# Patient Record
Sex: FEMALE | Race: WHITE | NOT HISPANIC OR LATINO | URBAN - METROPOLITAN AREA
[De-identification: names, ages, dates, MRNs, and addresses within clinical notes are randomized per-mention and may not be internally consistent; named-entity substitution may affect disease eponyms.]

---

## 2017-02-17 ENCOUNTER — TELEPHONE (OUTPATIENT)
Dept: FAMILY MEDICINE | Facility: CLINIC | Age: 22
End: 2017-02-17

## 2017-02-17 DIAGNOSIS — F64.0 GENDER DYSPHORIA IN ADOLESCENT AND ADULT: ICD-10-CM

## 2017-02-17 NOTE — TELEPHONE ENCOUNTER
[facility] phone call message- patient requesting a refill:    Full Medication Name: estradiol (ESTRACE) 2 MG tablet    Dose:    Pharmacy confirmed as   "[facility] - [place], [place] - [address] [facility]  [address] [address]  Phone: [phone] Fax: [phone]    [id number] - [place], [place] - [address]  [address] [address]  Phone: [phone] Fax: [phone]    [facility] - [place], [place] - [address]  [address] [facility] [address]  Phone: [phone] Fax: [phone]    [id number] [place], [place] - [address]  [address] [address]  Phone: [phone] Fax: [phone]    "[facility] - [place], [place] - [address] [facility] [address] [facility] [address] [facility] [address]  [address] [facility] [address]  Phone: [phone] Fax: [phone]  : Yes    Additional Comments: pt requesting Rx refill    OK to leave a message on voice mail? Yes    Primary language: English      needed? No    Call taken on February 17, 2017 at 1:33 PM by Edmar Waters

## 2017-02-18 RX ORDER — ESTRADIOL 2 MG/1
4 TABLET ORAL DAILY
Qty: 60 TABLET | Refills: 0 | Status: ON HOLD
Start: 2017-02-18 | End: 2017-03-29

## 2017-02-18 RX ORDER — ESTRADIOL 2 MG/1
4 TABLET ORAL DAILY
Qty: 180 TABLET | Refills: 3 | Status: SHIPPED
Start: 2017-02-18 | End: 2018-03-27

## 2017-02-18 NOTE — TELEPHONE ENCOUNTER
Called [facility] pharmacy and had prescription of estradiol sent to pharmacy of choice.

## 2017-03-24 ENCOUNTER — HOSPITAL ENCOUNTER (INPATIENT)
Facility: CLINIC | Age: 22
LOS: 4 days | Discharge: HOME | DRG: 881 | End: 2017-03-29
Attending: EMERGENCY MEDICINE | Admitting: PSYCHIATRY & NEUROLOGY
Payer: COMMERCIAL

## 2017-03-24 DIAGNOSIS — R23.2 HOT FLASHES: ICD-10-CM

## 2017-03-24 DIAGNOSIS — F33.3 SEVERE RECURRENT MAJOR DEPRESSIVE DISORDER WITH PSYCHOTIC FEATURES WITH ANXIOUS DISTRESS (H): Primary | ICD-10-CM

## 2017-03-24 DIAGNOSIS — R45.851 SUICIDAL IDEATION: ICD-10-CM

## 2017-03-24 DIAGNOSIS — F41.8 DEPRESSION WITH ANXIETY: ICD-10-CM

## 2017-03-24 LAB — ALCOHOL BREATH TEST: 0 (ref 0–0.01)

## 2017-03-24 PROCEDURE — 99285 EMERGENCY DEPT VISIT HI MDM: CPT | Mod: Z6 | Performed by: EMERGENCY MEDICINE

## 2017-03-24 PROCEDURE — 99285 EMERGENCY DEPT VISIT HI MDM: CPT | Performed by: EMERGENCY MEDICINE

## 2017-03-24 PROCEDURE — 99285 EMERGENCY DEPT VISIT HI MDM: CPT | Mod: 25 | Performed by: EMERGENCY MEDICINE

## 2017-03-24 NOTE — IP AVS SNAPSHOT
"    YOUNG ADULT INPATIENT MENTAL HEALTH: [phone]                                              INTERAGENCY TRANSFER FORM - LAB / IMAGING / EKG / EMG RESULTS   3/24/2017                    Hospital Admission Date: 3/24/2017  COLTON THOMPSON   : 1995  Sex: Female        Attending Provider: Jewels Hartmann MD     Allergies:  No Known Allergies    Infection:  None   Service:  MENTAL HEALT    Ht:  1.76 m (5' 9.29\")   Wt:  86 kg (189 lb 9.5 oz)   Admission Wt:  84.1 kg (185 lb 8 oz)    BMI:  27.76 kg/m 2   BSA:  2.05 m 2            Patient PCP Information     Provider PCP Type    Mahad Hillman DO General         Lab Results - 3 Days      Drug abuse screen 6 urine (chem dep) [[id number]] (Abnormal)  Resulted: 17 1557, Result status: Final result    Ordering provider: Rodrigo May MD  17 193 Resulting lab: [facility]    Specimen Information    Type Source Collected On   Urine Urine 17 1515          Components       Value Reference Range Flag Lab   Amphetamine Qual Urine -- NEG  13   Result:         Negative   Cutoff for a negative amphetamine is 500 ng/mL or less.     Barbiturates Qual Urine -- NEG  13   Result:         Negative   Cutoff for a negative barbiturate is 200 ng/mL or less.     Benzodiazepine Qual Urine -- NEG  13   Result:         Negative   Cutoff for a negative benzodiazepine is 200 ng/mL or less.     Cannabinoids Qual Urine -- NEG A 13   Result:         Positive   Cutoff for a positive cannabinoid is greater than 50 ng/mL. This is an   unconfirmed screening result to be used for medical purposes only.     Cocaine Qual Urine -- NEG  13   Result:         Negative   Cutoff for a negative cocaine is 300 ng/mL or less.     Ethanol Qual Urine -- NEG  13   Result:         Negative   Cutoff for a negative urine ethanol is 0.05 g/dL or less     Opiates Qualitative Urine -- NEG  13   Result:         Negative   Cutoff for a negative opiate is " 300 ng/mL or less.              HCG qualitative urine [[id number]]  Resulted: 03/27/17 1542, Result status: Final result    Ordering provider: Andrew Resendez MD  03/24/17 2002 Resulting lab: [facility]    Specimen Information    Type Source Collected On   Urine Urine 03/27/17 1515          Components       Value Reference Range Flag Lab   HCG Qual Urine Negative NEG  13            Testing Performed By     Lab - Abbreviation Name Director Address Valid Date Range    13 - Unknown [facility] Unknown [address] [facility] [address] 01/15/15 0916 - Present            Unresulted Labs     None      Encounter-Level Documents:     There are no encounter-level documents.      Order-Level Documents:     There are no order-level documents.

## 2017-03-24 NOTE — IP AVS SNAPSHOT
MRN:[id number]                      After Visit Summary   3/24/2017    Ca Ding    MRN: [id number]           Patient Information     Date Of Birth          1995        About your hospital stay     You were admitted on:  March 25, 2017 You last received care in the:  [facility] Adult Inpatient Mental Health    You were discharged on:  March 29, 2017       Who to Call     For medical emergencies, please call 911.  For non-urgent questions about your medical care, please call your primary care provider or clinic, [phone]          Attending Provider     Provider Specialty    Andrew Resendez MD Emergency Medicine    Jewels Hartmann MD Psychiatry       Primary Care Provider Office Phone # Fax #    Mahad Hillamn  [phone]       [facility] [address] [facility] [address]        Future tests that were ordered for you     Behavior Outpatient Eval       Outpatient provider to assess and treat.                  Further instructions from your care team       +Behavioral Discharge Planning and Instructions      Summary: You were admitted on 3/24/2017 to [address] for Depression and Suicidal Ideations.  You were treated by Dr. Jewels Hartmann MD and discharged on 03/29/17.     Disposition: Discharged to home    Main Diagnosis: (Per EMR)  1. Adjustment disorder with depressed mood  2. Borderline Personality Disorder  3. Hallucinogen use disorder (mescaline, acid, radha, mushroom)  4. Cannabis use disorder  5. Nicotine use disorder    Health Care Follow-up Appointments:   Medication Management  Date: April ,14th, 2017.  Time: 11:00am.  Provider: Dr. Conrad at [facility].  Address: [address] [facility] [address], [place] [address].  Phone: [phone]  [facility] has faxed the Discharge Summary and AVS to this provider at Fax: [phone].    Therapy Appointment   Date: Thursday , April 20th, 2017.  Time: 4:00pm    Provider: Kristal  Alondra at [facility] .  [address] [facility] [address] [address].  Phone: [phone].  [facility] has faxed the Discharge Summary and AVS to this provider at Fax: [phone].    DBT Therapy Appointments:  Date: 04/13/17 W/ Shirley Suazo  Time: 02:30 PM  Mental Health Services (DBT):  [facility]  [place] [address]  Phone: [phone]  Fax: [phone]  Medical Records Fax: [phone]  The [facility] has faxed the H&P, D/C Meds, Facesheet, discharge summary and AVS to this clinic at Fax: [phone] Attn: Intake    Attend all scheduled appointments with your outpatient providers. Call at least 24 hours in advance if you need to reschedule an appointment to ensure continued access to your outpatient providers.   Major Treatments, Procedures and Findings: You were provided with: a psychiatric assessment, assessed for medical stability, medication evaluation and/or management, group therapy, art therapy, milieu management, medical interventions and skills/OT groups.    Symptoms to Report: If you experience any of the following symptoms please report them right away to your provider or to family/friends; feeling more aggressive, increased confusion, losing more sleep, mood getting worse or thoughts of suicide.    Early warning signs can include: Early warning signs that could signal a potential relapse could include but not limited to the following; increased depression or anxiety sleep disturbances increased thoughts or behaviors of suicide or self-harm  increased unusual thinking, such as paranoia or hearing voices.    Safety and Wellness: Take all medicines as directed. Make no changes unless your doctor suggests them. Follow treatment recommendations. Refrain from alcohol and non-prescribed drugs.  Ask your support system to help you reduce your access to items that could harm yourself or others. If there is a concern for safety, call 991..    Resources:    Crisis  "Intervention: 516.476.3671 or 405-443-8789 (TTY: 270.572.6388).  Call anytime for help.  National [place] on Mental Illness (www.[place].briseida.org): 789.909.4442 or 333-921-4590.  [place] Association for Children's Mental Health (www.mac.org): 949.405.8572.  Alcoholics Anonymous (www.alcoholics-anonymous.org): Check your phone book for your local chapter.  Suicide Awareness Voices of Education (SAVE) (www.save.org): 245-365-SABB (7743)  National Suicide Prevention Line (www.mentalhealthmn.org): 488-504-OOYR (5398)  Mental Health Consumer/Survivor Network of [place] (www.mhcsn.net): 433.962.3911 or 580-779-5987  Mental Health Association of [place] (www.mentalhealth.org): 748.254.3612 or 193-249-6240  Self- Management and Recovery Training., SMART-- Toll free: 564.198.1693  www.[employer].Cody  [facility] Crisis (COPE) Response - Adult 751 564-3913  Text 4 Life: txt \"LIFE\" to 90229 for immediate support and crisis intervention  Crisis text line: Text \"START\" to 120-621. Free, confidential, 24/7.  Crisis Intervention: 870.528.3504 or 254-176-6309. Call anytime for help.     The treatment team has appreciated the opportunity to work with you. Ca,  please take care and make your recovery a daily recovery. If you have any questions or concerns our unit number is [phone]. You will be receiving a follow-up phone call within the next three days from a representative from behavioral health. You have identified the best phone number to reach you as [phone] (home) None (work).      Pending Results     No orders found from 3/22/2017 to 3/25/2017.            Admission Information     Date & Time Provider Department Dept. Phone    3/24/2017 Jewels Hartmann MD Young Adult Inpatient Mental Health [phone]      Your Vitals Were     Blood Pressure Pulse Temperature Respirations Height Weight    120/84 74 97.2  F (36.2  C) (Oral) 16 1.76 m (5' 9.29\") 86 kg (189 lb 9.5 oz)    Pulse Oximetry BMI (Body Mass Index)                " 97% 27.76 kg/m2          [employer] Information     [employer] gives you secure access to your electronic health record. If you see a primary care provider, you can also send messages to your care team and make appointments. If you have questions, please call your primary care clinic.  If you do not have a primary care provider, please call [phone] and they will assist you.        Care EveryWhere ID     This is your Care EveryWhere ID. 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 If we are uncertain of the size of tablets/capsules you have at home, strength may be listed as something that might have changed.        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 Continue taking this medication, and follow the directions you see here.   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)        Dose:  40 mg   Take 1 tablet (40 mg) by mouth daily   Quantity:  21 tablet   Refills:  0         CONTINUE these medicines which have NOT CHANGED        Dose / Directions    progesterone 100 MG capsule   Commonly known as:  PROMETRIUM   Used for:  Hot flashes        Dose:  100 mg   Take 1 capsule (100 mg) by mouth 2 times daily   Quantity:  28 capsule   Refills:  0         STOP taking     gabapentin 300 MG capsule   Commonly known as:  NEURONTIN   Replaced by:  gabapentin 600 MG tablet                Where to get your medicines      These medications were sent to [facility] [place], [place] - [address]  [address] [place] [address], [facility] [address]     Phone:  [phone]     citalopram 40 MG tablet    gabapentin 600 MG tablet    lurasidone 40 MG Tabs tablet    progesterone 100 MG capsule                Protect others around you: Learn how to safely use, store and throw away your medicines at www.disposemymeds.org.             Medication List: This is a list of all your medications and when to take them. Check marks below indicate your daily home schedule. Keep this list as a reference.      Medications           Morning Afternoon Evening Bedtime As Needed    citalopram 40 MG tablet   Commonly known as:  celeXA   Take 1 tablet (40 mg) by mouth daily for 21 days   Last time this was given:  40 mg on 3/29/2017  8:11 AM                                   estradiol 2 MG tablet   Commonly known as:  ESTRACE   Take 2 tablets (4 mg) by mouth daily   Last time this was given:  4 mg on 3/28/2017 10:09 PM                                   gabapentin 600 MG tablet   Commonly known as:  NEURONTIN   Take 2 tablets (1,200 mg) by mouth 3 times daily for 21 days   Last time this was given:  1,200 mg on 3/29/2017  8:11 AM                                          lurasidone 40 MG Tabs tablet   Commonly known as:  LATUDA   Take 1 tablet (40 mg) by mouth daily   Last time this was given:  40 mg on 3/28/2017  6:37 PM                                   progesterone 100 MG capsule   Commonly known as:  PROMETRIUM   Take 1 capsule (100 mg) by mouth 2 times daily   Last time this was given:  100 mg on 3/29/2017  8:12 AM

## 2017-03-24 NOTE — IP AVS SNAPSHOT
Young Adult [facility]    [facility] [address]    [address] [address]    Phone:  [phone]                                       After Visit Summary   3/24/2017    Ca Ding    MRN: [id number]           After Visit Summary Signature Page     I have received my discharge instructions, and my questions have been answered. I have discussed any challenges I see with this plan with the nurse or doctor.    ..........................................................................................................................................  Patient/Patient Representative Signature      ..........................................................................................................................................  Patient Representative Print Name and Relationship to Patient    ..................................................               ................................................  Date                                            Time    ..........................................................................................................................................  Reviewed by Signature/Title    ...................................................              ..............................................  Date                                                            Time

## 2017-03-25 PROBLEM — R45.851 SUICIDAL IDEATION: Status: ACTIVE | Noted: 2017-03-25

## 2017-03-25 PROCEDURE — H2032 ACTIVITY THERAPY, PER 15 MIN: HCPCS

## 2017-03-25 PROCEDURE — 25000132 ZZH RX MED GY IP 250 OP 250 PS 637: Performed by: EMERGENCY MEDICINE

## 2017-03-25 PROCEDURE — 25000132 ZZH RX MED GY IP 250 OP 250 PS 637: Performed by: PSYCHIATRY & NEUROLOGY

## 2017-03-25 PROCEDURE — 99207 ZZC CDG-EXAM COMPONENT: MEETS DETAILED - DOWN CODED: CPT | Performed by: PSYCHIATRY & NEUROLOGY

## 2017-03-25 PROCEDURE — 25000125 ZZHC RX 250: Performed by: PSYCHIATRY & NEUROLOGY

## 2017-03-25 PROCEDURE — 12400001 ZZH R&B MH UMMC

## 2017-03-25 PROCEDURE — 99221 1ST HOSP IP/OBS SF/LOW 40: CPT | Mod: AI | Performed by: PSYCHIATRY & NEUROLOGY

## 2017-03-25 RX ORDER — LURASIDONE HYDROCHLORIDE 20 MG/1
20 TABLET, FILM COATED ORAL AT BEDTIME
Status: DISCONTINUED | OUTPATIENT
Start: 2017-03-25 | End: 2017-03-25

## 2017-03-25 RX ORDER — LURASIDONE HYDROCHLORIDE 20 MG/1
40 TABLET, FILM COATED ORAL AT BEDTIME
Status: DISCONTINUED | OUTPATIENT
Start: 2017-03-25 | End: 2017-03-25

## 2017-03-25 RX ORDER — NICOTINE 21 MG/24HR
1 PATCH, TRANSDERMAL 24 HOURS TRANSDERMAL DAILY
Status: DISCONTINUED | OUTPATIENT
Start: 2017-03-25 | End: 2017-03-29 | Stop reason: HOSPADM

## 2017-03-25 RX ORDER — CITALOPRAM HYDROBROMIDE 20 MG/1
40 TABLET ORAL DAILY
Status: DISCONTINUED | OUTPATIENT
Start: 2017-03-25 | End: 2017-03-29 | Stop reason: HOSPADM

## 2017-03-25 RX ORDER — GABAPENTIN 300 MG/1
1200 CAPSULE ORAL 3 TIMES DAILY
Status: DISCONTINUED | OUTPATIENT
Start: 2017-03-25 | End: 2017-03-28 | Stop reason: CLARIF

## 2017-03-25 RX ORDER — ONDANSETRON 4 MG/1
4 TABLET, ORALLY DISINTEGRATING ORAL EVERY 6 HOURS PRN
Status: DISCONTINUED | OUTPATIENT
Start: 2017-03-25 | End: 2017-03-29 | Stop reason: HOSPADM

## 2017-03-25 RX ORDER — HYDROXYZINE HYDROCHLORIDE 25 MG/1
25-50 TABLET, FILM COATED ORAL EVERY 4 HOURS PRN
Status: DISCONTINUED | OUTPATIENT
Start: 2017-03-25 | End: 2017-03-29 | Stop reason: HOSPADM

## 2017-03-25 RX ORDER — ACETAMINOPHEN 325 MG/1
650 TABLET ORAL EVERY 4 HOURS PRN
Status: DISCONTINUED | OUTPATIENT
Start: 2017-03-25 | End: 2017-03-29 | Stop reason: HOSPADM

## 2017-03-25 RX ORDER — NEOMYCIN/BACITRACIN/POLYMYXINB 3.5-400-5K
OINTMENT (GRAM) TOPICAL 2 TIMES DAILY
Status: DISCONTINUED | OUTPATIENT
Start: 2017-03-25 | End: 2017-03-29 | Stop reason: HOSPADM

## 2017-03-25 RX ORDER — LURASIDONE HYDROCHLORIDE 20 MG/1
40 TABLET, FILM COATED ORAL DAILY
Status: DISCONTINUED | OUTPATIENT
Start: 2017-03-25 | End: 2017-03-29 | Stop reason: HOSPADM

## 2017-03-25 RX ORDER — ESTRADIOL 2 MG/1
4 TABLET ORAL DAILY
Status: DISCONTINUED | OUTPATIENT
Start: 2017-03-25 | End: 2017-03-29 | Stop reason: HOSPADM

## 2017-03-25 RX ADMIN — CITALOPRAM HYDROBROMIDE 40 MG: 20 TABLET ORAL at 08:43

## 2017-03-25 RX ADMIN — ONDANSETRON 4 MG: 4 TABLET, ORALLY DISINTEGRATING ORAL at 12:24

## 2017-03-25 RX ADMIN — PROGESTERONE 100 MG: 100 CAPSULE ORAL at 09:35

## 2017-03-25 RX ADMIN — PROGESTERONE 100 MG: 100 CAPSULE ORAL at 22:08

## 2017-03-25 RX ADMIN — GABAPENTIN 1200 MG: 300 CAPSULE ORAL at 02:19

## 2017-03-25 RX ADMIN — PROGESTERONE 100 MG: 100 CAPSULE ORAL at 02:18

## 2017-03-25 RX ADMIN — NICOTINE 1 PATCH: 14 PATCH, EXTENDED RELEASE TRANSDERMAL at 02:17

## 2017-03-25 RX ADMIN — GABAPENTIN 1200 MG: 300 CAPSULE ORAL at 08:43

## 2017-03-25 RX ADMIN — ACETAMINOPHEN 650 MG: 325 TABLET, FILM COATED ORAL at 02:18

## 2017-03-25 RX ADMIN — GABAPENTIN 1200 MG: 300 CAPSULE ORAL at 14:06

## 2017-03-25 RX ADMIN — BACITRACIN, NEOMYCIN, POLYMYXIN B: 400; 3.5; 5 OINTMENT TOPICAL at 21:42

## 2017-03-25 RX ADMIN — NICOTINE 1 PATCH: 14 PATCH, EXTENDED RELEASE TRANSDERMAL at 08:42

## 2017-03-25 RX ADMIN — ESTRADIOL 4 MG: 2 TABLET ORAL at 02:30

## 2017-03-25 RX ADMIN — GABAPENTIN 1200 MG: 300 CAPSULE ORAL at 21:42

## 2017-03-25 RX ADMIN — LURASIDONE HYDROCHLORIDE 40 MG: 20 TABLET, FILM COATED ORAL at 18:54

## 2017-03-25 RX ADMIN — HYDROXYZINE HYDROCHLORIDE 50 MG: 25 TABLET ORAL at 12:46

## 2017-03-25 RX ADMIN — LURASIDONE HYDROCHLORIDE 20 MG: 20 TABLET, FILM COATED ORAL at 02:18

## 2017-03-25 RX ADMIN — ESTRADIOL 4 MG: 2 TABLET ORAL at 21:41

## 2017-03-25 ASSESSMENT — ACTIVITIES OF DAILY LIVING (ADL)
ORAL_HYGIENE: INDEPENDENT
DRESS: STREET CLOTHES
GROOMING: INDEPENDENT
LAUNDRY: WITH SUPERVISION
GROOMING: INDEPENDENT
ORAL_HYGIENE: INDEPENDENT
DRESS: STREET CLOTHES

## 2017-03-25 NOTE — SUMMARY OF CARE
Pt search completed; pt wanded with a metal detector, pockets checked, belongings given to security to be stored, pt changed into scrubs, pt asked to leave a urine sample.  Pt explained the process; a RN will meet with them, as well as a doctor and an accessor, plan will be determined from there.
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
## 2017-03-25 NOTE — PLAN OF CARE
Problem: General Plan of Care (Inpatient Behavioral)  Goal: Team Discussion  Team Plan:   BEHAVIORAL TEAM DISCUSSION     Continued Stay Criteria/Rationale: 72-hour hold 3/24/17 at 2018 due to SI with plan  Plan: Monitor, assess and stabilize  Participants: George Posadas RN; HANNAH Hinoojsa  Summary/Recommendation: The plan is to assess and patient for mental health and medication needs.  The patient will be prescribed medications to treat the identified symptoms.  Upon discharge the patient will be referred to services as appropriate based on the assessment.  Medical/Physical: Deferred to medicine  Progress: Initial

## 2017-03-25 NOTE — PROGRESS NOTES
"   03/25/17 0109   Patient Belongings   Patient Belongings cell phone/electronics;clothing   Belongings Search Yes   Clothing Search Yes   Second Staff Nayla WHANG RN       Security Items:  [employer] debit card ending in 0157  1 [place] passport w/ 2 pay stubs     Items stored on unit:  1 jar of coconut oil  dilation equipment  1 navy blue hoodie with string  1 gray Star Wars babatunde  1 paisley print tank top  1 pair of jeans shorts  1 pair of pink and gray pj bottoms (HAS STRING)  1 stuffed animal (has a ribbon around the neck)  1 box of \"Zombie Fluxx\" cards  1 journal  2 books  3 pairs of underwear  1 necklace 1 pair of canvas sneakers  1 maroon dress  1 iphone with charging cable and power brick  1 set of keys with lanyard  1 work badge  1 sewing kit  1 small pouch with crystals/rocks (7 rocks with patient, 2 rocks in bin b/c too sharp, 9 total)  $3.06 cash  1 Pokemon pin  1 canvas handbag  1 black flashlight    Items with Patient- Brought in on 03/25/17  1- Love Letter Card Game  4- Books    Items with Patient- Brought in on 03/26/17  1- jeans  1-bra  1- dress  1-poncho  1-sweatshirt   7- smooth rocks    Additional belongings brought in by friends on 3/28/17 include the following: Tarot cards, 2 rocks and a small tote with a zipper    ADMISSION:  I am responsible for any personal items that are not sent to the safe or pharmacy. [place] [facility] is not responsible for loss, theft or damage of any property in my possession.    Patient Signature _____________________ Date/Time _____________________    Staff Signature _______________________ Date/Time _____________________    2nd Staff person, if patient is unable/unwilling to sign  ___________________________________ Date/Time _____________________    DISCHARGE:  My personal items have been returned to me.   Patient Signature _____________________ Date/Time _____________________           "

## 2017-03-25 NOTE — ED NOTES
Bed: ED11  Expected date:   Expected time:   Means of arrival:   Comments:  [id number]   22 female  Psych eval

## 2017-03-26 PROCEDURE — H2032 ACTIVITY THERAPY, PER 15 MIN: HCPCS

## 2017-03-26 PROCEDURE — 25000132 ZZH RX MED GY IP 250 OP 250 PS 637: Performed by: EMERGENCY MEDICINE

## 2017-03-26 PROCEDURE — 12400001 ZZH R&B MH UMMC

## 2017-03-26 PROCEDURE — 25000132 ZZH RX MED GY IP 250 OP 250 PS 637: Performed by: PSYCHIATRY & NEUROLOGY

## 2017-03-26 RX ADMIN — GABAPENTIN 1200 MG: 300 CAPSULE ORAL at 14:38

## 2017-03-26 RX ADMIN — ESTRADIOL 4 MG: 2 TABLET ORAL at 22:14

## 2017-03-26 RX ADMIN — GABAPENTIN 1200 MG: 300 CAPSULE ORAL at 08:13

## 2017-03-26 RX ADMIN — PROGESTERONE 100 MG: 100 CAPSULE ORAL at 22:15

## 2017-03-26 RX ADMIN — NICOTINE 1 PATCH: 14 PATCH, EXTENDED RELEASE TRANSDERMAL at 07:47

## 2017-03-26 RX ADMIN — GABAPENTIN 1200 MG: 300 CAPSULE ORAL at 19:21

## 2017-03-26 RX ADMIN — HYDROXYZINE HYDROCHLORIDE 50 MG: 25 TABLET ORAL at 22:19

## 2017-03-26 RX ADMIN — PROGESTERONE 100 MG: 100 CAPSULE ORAL at 07:48

## 2017-03-26 RX ADMIN — BACITRACIN, NEOMYCIN, POLYMYXIN B: 400; 3.5; 5 OINTMENT TOPICAL at 22:28

## 2017-03-26 RX ADMIN — BACITRACIN, NEOMYCIN, POLYMYXIN B: 400; 3.5; 5 OINTMENT TOPICAL at 07:49

## 2017-03-26 RX ADMIN — LURASIDONE HYDROCHLORIDE 40 MG: 20 TABLET, FILM COATED ORAL at 18:27

## 2017-03-26 RX ADMIN — CITALOPRAM HYDROBROMIDE 40 MG: 20 TABLET ORAL at 07:48

## 2017-03-26 ASSESSMENT — ACTIVITIES OF DAILY LIVING (ADL)
DRESS: STREET CLOTHES;INDEPENDENT
ORAL_HYGIENE: INDEPENDENT
DRESS: STREET CLOTHES
ORAL_HYGIENE: INDEPENDENT
GROOMING: INDEPENDENT
GROOMING: INDEPENDENT

## 2017-03-26 NOTE — PROGRESS NOTES
"Pt checked in feeling 'normal.' Pt's stated goal of the day was, \"to call people other than her ex.\" Pt had a calm and cooperative shift. Pt was present in the milieu and social with others. Pt denies feeling depressed or anxious. Pt denies auditory and visual hallucinations. Pt denies SI and SIB. Pt had a multitude of visitors, which made her day. Pt appeared cheerful and had therapeutic interactions with others.      03/26/17 1516   Behavioral Health   Hallucinations denies / not responding to hallucinations   Thinking distractable   Orientation person: oriented;place: oriented;date: oriented;time: oriented   Memory baseline memory   Insight poor   Judgement impaired   Eye Contact out of corner of eyes   Affect full range affect   Mood mood is calm   Physical Appearance/Attire attire appropriate to age and situation   Hygiene well groomed   Suicidality other (see comments)  (Pt denies SI)   Self Injury other (see comment)  (Pt denies SIB)   Activity other (see comment)  (Present and social in the milieu)   Speech clear;coherent   Medication Sensitivity no stated side effects;no observed side effects   Psychomotor / Gait balanced;steady   Safety   Suicidality status 15   Psycho Education   Type of Intervention 1:1 intervention   Response participates, initiates socially appropriate   Hours 0.5   Treatment Detail Self-Reflection   Activities of Daily Living   Hygiene/Grooming independent   Oral Hygiene independent   Dress street clothes   Room Organization independent   Activity   Activity Level of Assistance independent   Behavioral Health Interventions   Depression maintain safety precautions;monitor need to revise level of observation;maintain safe secure environment;assist patient in developing safety plan;assist patient in following safety plan;encourage nutrition and hydration;encourage participation / independence with adls;provide emotional support;establish therapeutic relationship;assist with developing " and utilizing healthy coping strategies;build upon strengths   Social and Therapeutic Interventions (Depression) encourage socialization with peers;encourage effective boundaries with peers;encourage participation in therapeutic groups and milieu activities

## 2017-03-26 NOTE — PROGRESS NOTES
03/25/17 2000   Behavioral Health   Hallucinations auditory   Thinking paranoid;poor concentration   Orientation person: oriented;place: oriented;date: oriented   Memory baseline memory   Insight poor   Judgement impaired   Eye Contact at floor;into space;staring   Affect blunted, flat;sad   Mood depressed;hopeless;anxious   Physical Appearance/Attire attire appropriate to age and situation   Hygiene well groomed   Suicidality chronic thoughts with no stated plan   Self Injury active   Activity isolative;withdrawn   Speech clear;coherent   Medication Sensitivity other (see comment)  (doesn't think medication is working)   Psychomotor / Gait slow;balanced;steady   Activities of Daily Living   Hygiene/Grooming independent   Oral Hygiene independent   Dress street clothes   Laundry with supervision   Room Organization independent     Patient states she is depressed and anxious. She feels as though no one wants her around and that she is a burden. Patient has been self-harming by hitting herself & scratching self. Examiner talked to patient for a while to try and figure out coping skills. Patient states she is a [employer] and would appreciate a visit from the  with some resources. Staff left message with Spiritual Services. Staff encouraged patient to sit in lounge with other patients and participate in activities. Patient responds well to 1:1 talks but needs direction when involving self on milieu. Patient is extremely.sensitive to perceived peer rejection. Patient complains of pain when dilating vagina and says there was nerve damage with surgery. Patient says she spoke to doctor but not much happened in regards to the pain. Examiner encouraged patient to bring this up with unit doctor. Patient also states she is hallucinating visually and seeing the walls/floor move.

## 2017-03-26 NOTE — H&P
"DATE OF ADMISSION:  03/24/2017.        DATE OF SERVICE:  03/25/2017.      CHIEF COMPLAINT:  \"I'm at a 10 out of 10 for suicidal thoughts.\"      HISTORY OF PRESENT ILLNESS:  Ca Ding is a 22-year-old male to female transgender individual who has had surgical reassignment.  The patient presented with depression and suicidal thinking in the setting of conflict with her fiance.  She reports that she and her fiance have an open polyamorous relationship.  The fiance apparently had told the patient that she was using birth control but recently became pregnant with another partner.  Ca is feeling as though this relationship is ending which has stressed her out and she is feeling extremely depressed, sad and suicidal at this time.  She is having thoughts to cut and thoughts of trying to hurt herself here on the unit.  As I process with her, though, she indicates that she will continue to be able to tell staff her thoughts to keep herself safe.  She does not feel that she needs a 1:1 or that she needs her room locked so she cannot be alone.      PAST PSYCHIATRIC HISTORY:  The patient denies ever having an inpatient hospitalization before.  When I tried to ask her about past suicidality she shut down and asked if she could end the interview.      PAST MEDICAL HISTORY:  Relevant medical history as noted in HPI.      SUBSTANCE HISTORY:  Again, patient had stopped answering questions prior to this.  Decline answered.      PHYSICAL REVIEW OF SYSTEMS:  The patient reported some nausea early in the interview.  Denies other problems on 10-point review of systems except as noted in HPI.      FAMILY HISTORY:  The patient did not answer questions related to family history.      SOCIAL HISTORY:  Relevant social history as noted in HPI.      ALLERGIES:  No known drug allergies.      PRIOR TO ADMISSION MEDICATIONS:   1.  Celexa 40 mg daily.   2.  Gabapentin 1200 mg 3 times daily.     3.  Latuda 40 mg daily.   4.  Oral hormone " medications.      LABORATORY RESULTS:  Alcohol breath was negative.  No other laboratory tests were performed.      VITAL SIGNS:  Temperature 97.3, pulse is 90, respiratory rate is 16, blood pressure is 120/76, oxygen saturation 97% on room air.      PHYSICAL EXAMINATION:  No physical exam as documented at this time.  Await consult by Internal Medicine Service.  I did review assessment by nutritionist.      MENTAL STATUS EXAMINATION:  The patient is casually dressed.  She has poor eye contact.  She has a paucity of spontaneous speech.  She is oriented to person, place and date.  She is severely depressed.  Her affect is tearful.  She has psychomotor retardation.  Muscle strength and tone and gait and station are within normal limits.  Thought process is linear and goal oriented.  Associations are intact.  Thought content is notable for suicidal thinking with plans.  Recent and remote memory are intact.  Fund of knowledge is adequate.  Attention and concentration are intact.  Insight is fair, judgment is poor.      DIAGNOSES:  Major depressive disorder, recurrent, severe, without psychotic features.      PLAN:   1.  Will continue with patient's current medications.  As indicated she shut down the interview and was unwilling to talk further about medication options.  She indicates that she had some twitching with Abilify in the past and found Latuda too sedating at 60 mg.  She thought Celexa was initially helpful and then has been no longer beneficial.  Primary team to consider alternative antidepressant options and alternative neuroleptic options.  May also wish to consider mood stabilizer as patient did report some instability of mood; however, no clear signs of cortney.   2.  Legal:  The patient is currently on a 72-hour hold.  Primary team to assess for safety on Monday to determine if petitioning for commitment is appropriate.   3.  Disposition:  The patient is severely depressed with acute suicidality currently,  requires assessment and treatment in a hospital environment to maintain safety.         RANDY VICTOR MD             D: 2017 15:21   T: 2017 02:19   MT:       Name:     COLTON THOMPSON   MRN:      [id number]        Account:      [id number]   :      1995           Admitted:     [id number]      Document: [id number]

## 2017-03-26 NOTE — PROGRESS NOTES
Pt stated she needs her privacy when using her dilator.  Admits having a room mate might be uncomfortable and in

## 2017-03-26 NOTE — PROGRESS NOTES
"   17   Art Therapy   Type of Intervention structured groups   Response participates with cues/redirection   Hours 4   groups were art therapy, draw yourself as a tree, yoga for relaxation and proper sleep as well as discussing sleep hygiene. Third Group was free choice art, or allowing no art making when discussing because this was a somewhat resistant group to Art Therapy. Writer then gave them the choice to doodle while we had a therapeutic group late afternoon about transforming negative messages we have about ourselves.The last one was a good group. In the morning a group of about 5-6 patients refused groups and played cards in the lounge. Team convened and decided they would have to  go to their rooms if they refused group participation. Writer thought that the best idea because they were banding together and refusing groups based on one female peer saying art therapy was triggering. That playing cards \"helped\" her sucidal ideation. Writer did pull them together as a group and discuss boundaries in regards to this presentation as well as \"what is Art Therapy\"     This patient was not influenced by the negative group dynamic, however this patients moods were very up and down all day. The patient was experiencing a lot of angst. She has a dramatic relationship story . She did overshare but was insightful enough to do it 1:1 with writer and most of the times not in group. She will openly discuss her \" polyamourous life style and how her girlfriend is pregrant by girlfriend's boyfriend and how she will be getting an . She also let writer know her creative body of watercolor paintings is \" vagina paintings\" which she had the insight to know wasn't appropriate on the unit. Writer said she appreciated but was glad she was expressing her creativity freely at home.     She also talked a lot about self harm. She self harmed on the unit, possibly with an eraser. Writer did not have any erasers out at " "all today. She said she was also \" fasting\" as a form of self injury. At the time reported only one ensure for the whole day. She did eat supper after this discussion .Writer did inform evening charge nurse and her psych associate,  To please  give her special attention and  check ins for the evening.  "

## 2017-03-27 LAB
AMPHETAMINES UR QL SCN: ABNORMAL
BARBITURATES UR QL: ABNORMAL
BENZODIAZ UR QL: ABNORMAL
CANNABINOIDS UR QL SCN: ABNORMAL
COCAINE UR QL: ABNORMAL
ETHANOL UR QL SCN: ABNORMAL
HCG UR QL: NEGATIVE
OPIATES UR QL SCN: ABNORMAL

## 2017-03-27 PROCEDURE — 25000132 ZZH RX MED GY IP 250 OP 250 PS 637: Performed by: EMERGENCY MEDICINE

## 2017-03-27 PROCEDURE — H2032 ACTIVITY THERAPY, PER 15 MIN: HCPCS

## 2017-03-27 PROCEDURE — 12400001 ZZH R&B MH UMMC

## 2017-03-27 PROCEDURE — 25000132 ZZH RX MED GY IP 250 OP 250 PS 637: Performed by: PSYCHIATRY & NEUROLOGY

## 2017-03-27 PROCEDURE — 99207 ZZC CDG-MDM COMPONENT: MEETS MODERATE - DOWN CODED: CPT | Performed by: PSYCHIATRY & NEUROLOGY

## 2017-03-27 PROCEDURE — 81025 URINE PREGNANCY TEST: CPT | Performed by: EMERGENCY MEDICINE

## 2017-03-27 PROCEDURE — 80307 DRUG TEST PRSMV CHEM ANLYZR: CPT | Performed by: EMERGENCY MEDICINE

## 2017-03-27 PROCEDURE — 80320 DRUG SCREEN QUANTALCOHOLS: CPT | Performed by: EMERGENCY MEDICINE

## 2017-03-27 PROCEDURE — 99232 SBSQ HOSP IP/OBS MODERATE 35: CPT | Performed by: PSYCHIATRY & NEUROLOGY

## 2017-03-27 RX ORDER — LANOLIN ALCOHOL/MO/W.PET/CERES
3 CREAM (GRAM) TOPICAL
Status: DISCONTINUED | OUTPATIENT
Start: 2017-03-27 | End: 2017-03-29 | Stop reason: HOSPADM

## 2017-03-27 RX ADMIN — PROGESTERONE 100 MG: 100 CAPSULE ORAL at 22:18

## 2017-03-27 RX ADMIN — HYDROXYZINE HYDROCHLORIDE 50 MG: 25 TABLET ORAL at 17:21

## 2017-03-27 RX ADMIN — ESTRADIOL 4 MG: 2 TABLET ORAL at 22:18

## 2017-03-27 RX ADMIN — GABAPENTIN 1200 MG: 300 CAPSULE ORAL at 22:18

## 2017-03-27 RX ADMIN — NICOTINE 1 PATCH: 14 PATCH, EXTENDED RELEASE TRANSDERMAL at 10:19

## 2017-03-27 RX ADMIN — GABAPENTIN 1200 MG: 300 CAPSULE ORAL at 10:21

## 2017-03-27 RX ADMIN — MELATONIN TAB 3 MG 3 MG: 3 TAB at 22:18

## 2017-03-27 RX ADMIN — PROGESTERONE 100 MG: 100 CAPSULE ORAL at 10:21

## 2017-03-27 RX ADMIN — GABAPENTIN 1200 MG: 300 CAPSULE ORAL at 13:52

## 2017-03-27 RX ADMIN — LURASIDONE HYDROCHLORIDE 40 MG: 20 TABLET, FILM COATED ORAL at 19:13

## 2017-03-27 RX ADMIN — CITALOPRAM HYDROBROMIDE 40 MG: 20 TABLET ORAL at 10:21

## 2017-03-27 ASSESSMENT — ACTIVITIES OF DAILY LIVING (ADL)
ORAL_HYGIENE: INDEPENDENT
GROOMING: INDEPENDENT
DRESS: STREET CLOTHES;INDEPENDENT

## 2017-03-27 NOTE — PROGRESS NOTES
"[facility], [place]   Psychiatric Progress Note        Interim History:   The patient's care was discussed with the treatment team during the daily team meeting and/or staff's chart notes were reviewed.      Pt reports that she has been seeing a psychiatrist for 6 months and a therapist for one year now. She has been receiving psychiatric treatment since she was 10 yo.   She has rapid mood swings-one moment she is depressed, next moment she is happy.   She has been diagnosed with Bipolar disorder, PTSD, psychosis, Borderline personality disorder, in the past.   Lately she has been under a lot of stress-financial, relationship, housing. She literally cannot afford to buy food, after paying the rent. She is happy that she gets regular meals here.   On the day of admission, she said she would jump in front of traffic, to her fiance and her boyfriend.   They stopped her from going out of their apartment, then she said that she would slit her wrist, then. Recently she found out that her fiance was pregnant by her boyfriend.   For a few months after Celexa and Latuda were started by her psychiatrist, she felt good. But at one point, they stopped working. As she could not pay for them, for 2 days prior to coming to ER, she had been off meds.     In the past, she tried Abilify, which caused twich, Lamotrigine, which she does not remember if it worked or not.   On the first day after admission, she thoughts that the walls of the unit was breathing and moving.   When she tried Latuda 60mg, it made her overtired.       As of this interview, she was feeling \"great\" without si.   As for substance abuse, she smokes cig 1ppd, Cannabis every day for 4 years, Acid 3 times a month, mushroom 3 times a month,. She tried radha once, mescaline 4 times. She says she really does not want to take SSRI, for fear of serotonin syndrome. She says she has no intention of stopping these drugs.            Medications: " "      citalopram (celeXA) tablet 40 mg  40 mg Oral Daily     estradiol  4 mg Oral Daily     gabapentin  1,200 mg Oral TID     progesterone  100 mg Oral BID     nicotine   Transdermal Q8H     nicotine   Transdermal Daily     nicotine  1 patch Transdermal Daily     lurasidone  40 mg Oral Daily     neomycin-bacitracin-polymyxin   Topical BID          Allergies:   No Known Allergies       Labs:   No results found for this or any previous visit (from the past 24 hour(s)).       Psychiatric Examination:   /68  Pulse 80  Temp 95.8  F (35.4  C) (Oral)  Resp 16  Ht 1.76 m (5' 9.29\")  Wt 84.9 kg (187 lb 3.2 oz)  SpO2 97%  BMI 27.41 kg/m2  Weight is 187 lbs 3.2 oz  Body mass index is 27.41 kg/(m^2).    Appearance: awake, alert  Attitude:  cooperative  Eye Contact:  good  Mood:  better  Affect:  constricted mobility  Speech:  clear, coherent  Psychomotor Behavior:  no evidence of tardive dyskinesia, dystonia, or tics  Throught Process:  linear  Associations:  no loose associations  Thought Content:  no evidence of suicidal ideation or homicidal ideation, no delusion  Insight:  fair  Judgement:  fair  Oriented to:  time, person, and place  Attention Span and Concentration:  fair  Recent and Remote Memory:  fair         Precautions:     Behavioral Orders   Procedures     Code 1 - Restrict to Unit     Routine Programming     As clinically indicated     Status 15     Every 15 minutes.          DIagnoses:   1. Adjustment disorder with depressed mood  2. Borderline Personality Disorder  3. Hallucinogen use disorder( mescaline, acid, radha, mushroom)  4, Cannabis use disorder  5,. Nicotine use disorder         Plan:   1.  Continue current meds as is for now. We discussed potentially changing meds, but without intervention, her mood actually improved already.   2. Continue group/milieu treatment.   3. Referral to PHP.   4. Referral to CD assessment.        "

## 2017-03-27 NOTE — PROGRESS NOTES
Writer introduced self to pt and encouraged pt to seek out CTC should she have any questions or concerns.

## 2017-03-27 NOTE — PROGRESS NOTES
"   03/26/17 2200   Art Therapy   Type of Intervention structured groups   Response participates, initiates socially appropriate   Hours 4   Ca had a great day. She said she mediated at the beginning of the day and had some insights about her SI being related to frustration and that she thought she would take up \" aikido.\" She also enjoyed yoga and asked for a mat and some print outs to do yoga in her room  As a coping skill to use through the day. During the collaborative process, she was a great team member and collaborated well with her partner, and seemed invested in the process and product. Her mood was very much improved today vs yesterday. She had a lot of visitors and she also seemed proud she had reached her daily goals.  "

## 2017-03-27 NOTE — PROGRESS NOTES
SPIRITUAL HEALTH SERVICES  [facility] ([facility]) 4AW      REFERRAL SOURCE: emma Penaloza shared the reasons for coming to the hospital and a bit about their [employer] spirituality. Ca expressed that some lele and bayleaf would be helpful in connecting her to her spirituality, so I will attempt to bring these items tomorrow if possible.    PLAN: I will attempt to locate these items.                                                                                                                                                Arline Howard  Staff   Pager [phone]

## 2017-03-27 NOTE — PROGRESS NOTES
[facility]   Behavioral Health   Hallucinations denies / not responding to hallucinations   Thinking intact   Orientation person: oriented;place: oriented;date: oriented;time: oriented   Memory baseline memory   Insight poor   Judgement impaired   Eye Contact at examiner   Affect full range affect   Mood anxious;depressed;mood is calm   Physical Appearance/Attire attire appropriate to age and situation;neat   Hygiene well groomed   Suicidality other (see comments)  (denies)   Self Injury other (see comment)  (denies)   Activity other (see comment)  (active)   Speech clear;coherent   Medication Sensitivity no stated side effects;no observed side effects   Psychomotor / Gait balanced;steady   Activities of Daily Living   Hygiene/Grooming independent   Oral Hygiene independent   Dress street clothes;independent   Room Organization independent   Behavioral Health Interventions   Depression assist patient in developing safety plan;assist patient in following safety plan;encourage nutrition and hydration;encourage participation / independence with adls;provide emotional support;establish therapeutic relationship;assist with developing and utilizing healthy coping strategies;build upon strengths   Social and Therapeutic Interventions (Depression) encourage socialization with peers;encourage effective boundaries with peers;encourage participation in therapeutic groups and milieu activities     Pt was calm,  Pleasant, and approachable. Pt attended and participated in groups. Pt was social and engaging with others. Pt affect: full range. Pt reported TV show ([email or web address]) was triggering - talk about suicide, this increased Pt's feelings of depression. Pt reported depression (8) and anxiety (6). Pt reported feeling down - Pt doesn't feel want. Anxious/worried about not being DC in time for fiance's . Pt denied SI and SIB. Pt denied hallucinations/psychotic symptoms.

## 2017-03-27 NOTE — PROGRESS NOTES
W met with pt to discuss discharge plans. Pt reports that she would like to do [facility]'s PHP Program which Unit MD also agrees pt would benefit from. [facility] has sent in-basket referral to BEH Intake to have pt assessed for program while pt is still on Unit [address] [place].

## 2017-03-27 NOTE — PROGRESS NOTES
03/26/17 2100   Behavioral Health   Hallucinations denies / not responding to hallucinations   Thinking intact   Orientation person: oriented;place: oriented;date: oriented;time: oriented   Memory baseline memory   Insight poor   Judgement impaired   Eye Contact at examiner   Affect full range affect   Mood anxious;mood is calm   Physical Appearance/Attire attire appropriate to age and situation   Hygiene well groomed   Suicidality thoughts only   Self Injury thoughts only   Activity other (see comment)  (active in milieu)   Speech clear;coherent   Medication Sensitivity no stated side effects;no observed side effects   Psychomotor / Gait balanced;steady   Activities of Daily Living   Hygiene/Grooming independent   Oral Hygiene independent   Dress street clothes;independent   Room Organization independent     Patient was active on milieu and in much better spirits than the day before. Patient attended groups and was social with peers. Patient had anxiety later in evening but sought out staff and appeared to feel better. Constant thoughts of SI and SIB, but no plans. Patient ate dinner and snack. Patient would also like to have a roommate. Pleasant and cooperative.

## 2017-03-27 NOTE — PROGRESS NOTES
"   03/27/17 1700   Art Therapy   Type of Intervention structured groups   Response participates with encouragement   Hours 3   Ca did a nice painting a heart about bieng \" polyamourous and trans\". She was in and out of groups but is a leader in yoga and mediation. She is feeling good with doing daily yoga on the unit she reported.  "

## 2017-03-28 PROCEDURE — 12400001 ZZH R&B MH UMMC

## 2017-03-28 PROCEDURE — 25000132 ZZH RX MED GY IP 250 OP 250 PS 637: Performed by: EMERGENCY MEDICINE

## 2017-03-28 PROCEDURE — 90853 GROUP PSYCHOTHERAPY: CPT

## 2017-03-28 PROCEDURE — 97150 GROUP THERAPEUTIC PROCEDURES: CPT | Mod: GO

## 2017-03-28 PROCEDURE — 99207 ZZC CDG-MDM COMPONENT: MEETS MODERATE - DOWN CODED: CPT | Performed by: PSYCHIATRY & NEUROLOGY

## 2017-03-28 PROCEDURE — 25000132 ZZH RX MED GY IP 250 OP 250 PS 637: Performed by: PSYCHIATRY & NEUROLOGY

## 2017-03-28 PROCEDURE — 99232 SBSQ HOSP IP/OBS MODERATE 35: CPT | Performed by: PSYCHIATRY & NEUROLOGY

## 2017-03-28 RX ORDER — GABAPENTIN 600 MG/1
1200 TABLET ORAL 3 TIMES DAILY
Status: DISCONTINUED | OUTPATIENT
Start: 2017-03-28 | End: 2017-03-29 | Stop reason: HOSPADM

## 2017-03-28 RX ADMIN — GABAPENTIN 1200 MG: 300 CAPSULE ORAL at 08:16

## 2017-03-28 RX ADMIN — PROGESTERONE 100 MG: 100 CAPSULE ORAL at 22:09

## 2017-03-28 RX ADMIN — NICOTINE 1 PATCH: 14 PATCH, EXTENDED RELEASE TRANSDERMAL at 08:14

## 2017-03-28 RX ADMIN — GABAPENTIN 1200 MG: 600 TABLET, FILM COATED ORAL at 13:25

## 2017-03-28 RX ADMIN — MELATONIN TAB 3 MG 3 MG: 3 TAB at 22:10

## 2017-03-28 RX ADMIN — CITALOPRAM HYDROBROMIDE 40 MG: 20 TABLET ORAL at 08:11

## 2017-03-28 RX ADMIN — PROGESTERONE 100 MG: 100 CAPSULE ORAL at 08:11

## 2017-03-28 RX ADMIN — LURASIDONE HYDROCHLORIDE 40 MG: 20 TABLET, FILM COATED ORAL at 18:37

## 2017-03-28 RX ADMIN — ESTRADIOL 4 MG: 2 TABLET ORAL at 22:09

## 2017-03-28 RX ADMIN — GABAPENTIN 1200 MG: 600 TABLET, FILM COATED ORAL at 22:09

## 2017-03-28 RX ADMIN — HYDROXYZINE HYDROCHLORIDE 50 MG: 25 TABLET ORAL at 13:25

## 2017-03-28 ASSESSMENT — ACTIVITIES OF DAILY LIVING (ADL)
DRESS: STREET CLOTHES;INDEPENDENT
ORAL_HYGIENE: INDEPENDENT
GROOMING: INDEPENDENT
DRESS: INDEPENDENT
ORAL_HYGIENE: INDEPENDENT
GROOMING: INDEPENDENT

## 2017-03-28 NOTE — PROGRESS NOTES
Case Management Note  3/28/2017    Pt reports that she is interested in the doing the five day a week MICD program but is concerned about not being able to work for 6-8 weeks. W explained that the program is three hours long each day so maybe she could adjust her schedule or see if there is anyway to do the program and work at the same time. Pt reports she will see and reports she is also interested in doing an intensive DBT Program. Pt was given information on various DBT Programs. W asked pt if she was interested in getting assessed for the MICD Day TX Program on [id number] Pt reports she is interested in attending the program and asked writer to set up the assessment. Writer spoke to MICD Program  who scheduled pt for the 9AM slot on Friday. Pt reports she will let writer know about doing a DBT program later after she has had a chance reviewing the information W gave to her. Pt reports she is doing a lot better and became upset earlier because she thought that she would have to stay until Friday when she could get her assessment. W explained that the pt's D/C is up to the 4A MD and that she could discuss discharging tomorrow with the provider tomorrow morning. Pt reports that she has fencing tomorrow night and that it is something very therapeutic for her to do and looks forward to doing it. Pt denies having any SI's or SIB's at this time.

## 2017-03-28 NOTE — PROGRESS NOTES
"W met with pt regarding doing the MICD Day TX Program due to her daily use of THC prior to admission. Pt reports that she needs a five day a week PHP program and reports that she can stop using drugs anytime as she reports she \"isn't addicted to any drugs\". W explained the admission criteria for the PHP and pt became visibly upset that she couldn't do the program and said to writer \"that is just really harmful that they won't let me do the partial hospitalization program\". Pt then stated she needed to go to her room and then ended the conversation. Writer informed pt's nurse that pt is upset regarding not being able to do the PHP Program at [facility].   "
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
## 2017-03-28 NOTE — PROGRESS NOTES
"Attendance: Pt. Attended 3 of 3 scheduled OT sessions today.   Observations: pt was assertive about \"attending every group so I can have a good assessment.\" Pt reports appreciating activities and discussions, Pt described interest in Anime, medieval re-enactment, and magic the gathering card game. Pt was open about Hx including speaking Azeri and living in [place] prior to [place]. Pt was generally agitated throughout groups demonstrated by reporting it, fidgeting, and asking questions. Pt requested yoga and was grateful to participate in PM yoga group.     03/28/17 1600   Occupational Therapy   Type of Intervention structured groups   Response Participates   Hours 3       "

## 2017-03-28 NOTE — PROGRESS NOTES
Nutrition Services Brief Note  RD attempted to visit with pt 3/25 for unintended weight loss screen however unable to obtain nutrition/wt hx    Pt reports inability to purchase food largest barrier to consuming meals at home.  Notes she does not qualify for food assistance programs d/t income however states she barely has enough money monthly to pay bills.  Since being on unit has been eating well.  Concerned about weight gain as she feels that she is overeating.      Interventions:  -Nutrition education - discussed available resources to improve food access OSH however pt declined at this time.  Encouraged increased intakes of fruits/veggies vs kcal dense foods.  Pt requesting Ensure as meal replacement  -Ordered Ensure PRN per pt preference    Shu Castle RD [facility] [phone]

## 2017-03-28 NOTE — PROGRESS NOTES
03/27/17 2200   Behavioral Health   Hallucinations denies / not responding to hallucinations   Thinking intact   Orientation person: oriented;place: oriented;date: oriented;time: oriented   Memory baseline memory   Insight insight appropriate to situation;insight appropriate to events   Judgement impaired   Eye Contact at examiner   Affect full range affect   Mood depressed;anxious;mood is calm   Physical Appearance/Attire attire appropriate to age and situation   Hygiene well groomed   Suicidality other (see comments)  (denies)   Self Injury other (see comment)  (denies)   Activity other (see comment)  (active in milieu)   Speech clear;coherent   Medication Sensitivity no stated side effects;no observed side effects   Psychomotor / Gait balanced;steady     Patient was active on milieu and attended all groups. Pleasant and cooperative. Patient had visit with friends and mother, and was concerned about mother, but  visit went well. Started feeling depressed later in evening, but was able to talk it out with staff. Is easily triggered by talk/movies with suicide and transphobic comments. Good at seeking out staff for help.

## 2017-03-28 NOTE — PROGRESS NOTES
03/28/17 1500   Behavioral Health   Hallucinations denies / not responding to hallucinations   Thinking intact   Orientation person: oriented;place: oriented;date: oriented;time: oriented   Memory baseline memory   Insight insight appropriate to situation;insight appropriate to events   Judgement impaired   Affect full range affect   Mood labile   Physical Appearance/Attire attire appropriate to age and situation;neat   Hygiene well groomed   Suicidality other (see comments)  (denied)   Self Injury other (see comment)  (denied)   Activity other (see comment)  (active)   Speech clear;coherent   Medication Sensitivity no stated side effects;no observed side effects   Psychomotor / Gait balanced;steady   Psycho Education   Type of Intervention 1:1 intervention   Response participates, initiates socially appropriate   Hours 0.5   Activities of Daily Living   Hygiene/Grooming independent   Oral Hygiene independent   Dress street clothes;independent   Room Organization independent   Behavioral Health Interventions   Depression assist patient in developing safety plan;assist patient in following safety plan;encourage participation / independence with adls;provide emotional support;establish therapeutic relationship;assist with developing and utilizing healthy coping strategies;build upon strengths;encourage nutrition and hydration   Social and Therapeutic Interventions (Depression) encourage socialization with peers;encourage effective boundaries with peers;encourage participation in therapeutic groups and milieu activities     Pt was pleasant and approachable. Pt was social with others. Pt was attended and participated in groups. Pt completed ADL's. Pt denied SI and SIB. Pt denied hallucinations/psychotic symptoms.

## 2017-03-28 NOTE — PROGRESS NOTES
03/27/17 2200   Behavioral Health   Hallucinations denies / not responding to hallucinations   Thinking intact   Orientation person: oriented;place: oriented;date: oriented;time: oriented   Memory baseline memory   Insight insight appropriate to situation;insight appropriate to events   Judgement impaired   Eye Contact at examiner   Affect full range affect   Mood depressed;anxious;mood is calm   Physical Appearance/Attire attire appropriate to age and situation   Hygiene well groomed   Activity other (see comment)  (active in milieu)   Speech clear;coherent   Medication Sensitivity no stated side effects;no observed side effects   Psychomotor / Gait balanced;steady     Patient was active on milieu, pleasant and cooperative. Had visit with mother/friends that went well. Good at seeking out staff for support. Felt sad later in evening because she was missing home. No thoughts of SI or SIB. Good appetite and sleep. Participated in all groups.

## 2017-03-29 VITALS
OXYGEN SATURATION: 97 % | TEMPERATURE: 97.2 F | BODY MASS INDEX: 28.08 KG/M2 | SYSTOLIC BLOOD PRESSURE: 120 MMHG | RESPIRATION RATE: 16 BRPM | HEIGHT: 69 IN | WEIGHT: 189.6 LBS | HEART RATE: 74 BPM | DIASTOLIC BLOOD PRESSURE: 84 MMHG

## 2017-03-29 PROCEDURE — H2032 ACTIVITY THERAPY, PER 15 MIN: HCPCS

## 2017-03-29 PROCEDURE — 25000132 ZZH RX MED GY IP 250 OP 250 PS 637: Performed by: PSYCHIATRY & NEUROLOGY

## 2017-03-29 PROCEDURE — 99239 HOSP IP/OBS DSCHRG MGMT >30: CPT | Performed by: PSYCHIATRY & NEUROLOGY

## 2017-03-29 PROCEDURE — 25000132 ZZH RX MED GY IP 250 OP 250 PS 637: Performed by: EMERGENCY MEDICINE

## 2017-03-29 RX ORDER — CITALOPRAM HYDROBROMIDE 40 MG/1
40 TABLET ORAL DAILY
Qty: 21 TABLET | Refills: 0 | Status: SHIPPED | OUTPATIENT
Start: 2017-03-29 | End: 2017-05-10

## 2017-03-29 RX ORDER — LURASIDONE HYDROCHLORIDE 40 MG/1
40 TABLET, FILM COATED ORAL DAILY
Qty: 21 TABLET | Refills: 0 | Status: SHIPPED | OUTPATIENT
Start: 2017-03-29

## 2017-03-29 RX ORDER — GABAPENTIN 600 MG/1
1200 TABLET ORAL 3 TIMES DAILY
Qty: 126 TABLET | Refills: 0 | Status: SHIPPED | OUTPATIENT
Start: 2017-03-29 | End: 2017-05-10

## 2017-03-29 RX ADMIN — CITALOPRAM HYDROBROMIDE 40 MG: 20 TABLET ORAL at 08:11

## 2017-03-29 RX ADMIN — NICOTINE 1 PATCH: 14 PATCH, EXTENDED RELEASE TRANSDERMAL at 08:12

## 2017-03-29 RX ADMIN — PROGESTERONE 100 MG: 100 CAPSULE ORAL at 08:12

## 2017-03-29 RX ADMIN — GABAPENTIN 1200 MG: 600 TABLET, FILM COATED ORAL at 08:11

## 2017-03-29 ASSESSMENT — ACTIVITIES OF DAILY LIVING (ADL)
LAUNDRY: WITH SUPERVISION
GROOMING: INDEPENDENT
DRESS: INDEPENDENT
ORAL_HYGIENE: INDEPENDENT

## 2017-03-29 NOTE — PLAN OF CARE
"Problem: General Plan of Care (Inpatient Behavioral)  Goal: Individualization/Patient Specific Goal (IP Behavioral)  The patient and/or their representative will achieve their patient-specific goals related to the plan of care.    The patient-specific goals include:    Illness Management Recovery model: Objectives  Patient will identify reason(s) for hospitalization from their perspective.  Patient will identify a minimum of three goals for discharge.  Patient will identify a minimum of three triggers that may increase their symptoms.  Patient will identify a minimum of three coping skills they can do to stay well.   Patient will identify their support system to demonstrate readiness for discharge.    Illness Management & Recovery assists patient to develop relapse prevention as  patient identifies triggers for relapse.  patient identifies a general wellness strategy.  patient identifies the warning signs that they are in danger of relapse.  patient identifies someone they count on to get feedback .  patient identifies ways to take action when in danger of relapse.  patient identifies way to cope with stress or other symptoms.   patient participates in self-reflection.   Outcome: Improving  The patient and/or their representative will achieve their patient-specific goals related to the plan of care.    The patient-specific goals include:      \"Reasons you are in the hospital;\" The patient identifies the following reasons for current hospitalization:   1) \"Suicide Attempt\".  2) \"BPD and mood swings\".  3) \"Suicidal Ideations\".     \"Goals for Discharge\" The patient identifies the following goals for discharge:  1) \"Lower urges of SI's\".  2) \"Stabalize\".          "

## 2017-03-29 NOTE — PROGRESS NOTES
All discharge paperwork reviewed with patient. Patient verbalizes readiness for discharge. Denies SI/SIB at this time and has a plan in place for if these thoughts occur. Patient's mother picked her up at 1230. Patient's belongings and medication were sent with her upon discharge.

## 2017-03-29 NOTE — DISCHARGE SUMMARY
Psychiatric Discharge Summary    Ca Ding MRN# [id number]   Age: 22 year old YOB: 1995     Date of Admission:  3/24/2017  Date of Discharge:  3/29/2017  Admitting Physician:  Jewels Hartmann MD  Discharge Physician:  Jewels Hartmann MD (Contact: [phone])         Event Leading to Hospitalization:   Ca Ding is a 22-year-old male to female transgender individual who has had surgical reassignment. The patient presented with depression and suicidal thinking in the setting of conflict with her fiance. She reports that she and her fiance have an open polyamorous relationship. The fiance apparently had told the patient that she was using birth control but recently became pregnant with another partner. Ca is feeling as though this relationship is ending which has stressed her out and she is feeling extremely depressed, sad and suicidal at this time. She is having thoughts to cut and thoughts of trying to hurt herself here on the unit. As I process with her, though, she indicates that she will continue to be able to tell staff her thoughts to keep herself safe.        See Admission note by He Longoria MD on 3/25/2017 for additional details.          DIagnoses:   1. Major Depressive Disorder, recurrent  2. Borderline Personality Disorder  3. Hallucinogen use disorder( mescaline, acid, radha, mushroom)  4, Cannabis use disorder  5,. Nicotine use disorder           Labs:          Lab Results   Component Value Date    .2 08/08/2016    Lab Results   Component Value Date    CHLORIDE 100.9 08/08/2016    Lab Results   Component Value Date    BUN 8.9 08/08/2016      Lab Results   Component Value Date    POTASSIUM 4.4 08/08/2016    Lab Results   Component Value Date    CO2 28.1 08/08/2016    Lab Results   Component Value Date    CR 0.6 08/08/2016        Lab Results   Component Value Date    HGB 12.5 (L) 08/08/2016    HCT 42.1 03/16/2016    MCV 88.0 03/16/2016     Lab Results   Component Value  Date    GLC 84.4 08/08/2016    GLC 79.0 08/08/2016     Lab Results   Component Value Date    TSH 1.14 11/06/2015   Utox was positive for cannabinoid.         Consults:   We obtained assessment by [facility] staff on 3/28/2017, but patient decided that she will benefit most from intensive DBT program.          Hospital Course:   Ca Ding was admitted to Station 4A with attending Jewels Hatrmann MD as a voluntary patient. The patient was placed under status 15 (15 minute checks) and suicide precaution to ensure patient safety.   Her mood and si quickly improved after she was admitted. She said that she had been very stressed prior to admission. Medications were not changed at all, all outpatient medications were continued.   She did not have significant side effect from meds.   She seemed to have done a lot of self-reflection while she was here, and Ca Ding did participate in groups and was visible in the milieu.     The patient's symptoms of depressed mood and suicidal ideation kept improving during the stay and she became hopeful again for her life.   On the day of discharge, she was looking forward to going to [employer] practice.    Ca Ding was released to home. At the time of discharge Ca Ding was determined to not be a danger to herself or others.          Discharge Medications:     Current Discharge Medication List      CONTINUE these medications which have NOT CHANGED    Details   CITALOPRAM HYDROBROMIDE PO Take 40 mg by mouth daily      !! estradiol (ESTRACE) 2 MG tablet Take 2 tablets (4 mg) by mouth daily  Qty: 60 tablet, Refills: 0    Associated Diagnoses: Gender dysphoria in adolescent and adult      !! estradiol (ESTRACE) 2 MG tablet Take 2 tablets (4 mg) by mouth daily  Qty: 180 tablet, Refills: 3    Associated Diagnoses: Gender dysphoria in adolescent and adult      lurasidone (LATUDA) 20 MG TABS tablet Take 1 tablet (20 mg) by mouth At Bedtime  Qty: 30 tablet, Refills: 3     Associated Diagnoses: Bipolar affective disorder, remission status unspecified (H)      progesterone (PROMETRIUM) 100 MG capsule Take 1 capsule (100 mg) by mouth 2 times daily  Qty: 60 capsule, Refills: 3    Associated Diagnoses: Gender dysphoria      gabapentin (NEURONTIN) 300 MG capsule Take 4 capsules (1,200 mg) by mouth 3 times daily  Qty: 270 capsule, Refills: 1    Associated Diagnoses: Major depressive disorder with single episode, remission status unspecified       !! - Potential duplicate medications found. Please discuss with provider.               Psychiatric Examination:   Appearance:  awake, alert  Attitude:  cooperative  Eye Contact:  good  Mood:  better  Affect:  constricted mobility  Speech:  clear, coherent  Psychomotor Behavior:  no evidence of tardive dyskinesia, dystonia, or tics  Thought Process:  logical  Associations:  no loose associations  Thought Content:  no evidence of suicidal ideation or homicidal ideation  Insight:  fair  Judgment:  fair  Oriented to:  time, person, and place  Attention Span and Concentration:  fair  Recent and Remote Memory:  fair  Language: Able to name objects  Fund of Knowledge: appropriate  Muscle Strength and Tone: normal  Gait and Station: Normal         Discharge Plan:   Medication Management  Date: April ,14th, 2017.  Time: 11:00am.  Provider: Dr. Conrad at [facility].  Address: [address], [place] [address].  Phone: [phone]  The [facility] has faxed the Discharge Summary and AVS to this provider at Fax: [phone].     Therapy Appointment   Date: Thursday , April 20th, 2017.  Time: 4:00pm   Provider: Kristal Cantu at [facility] .  Address: [address] [place]. [facility].  Phone: [phone].  The [facility] has faxed the Discharge Summary and AVS to this provider at Fax: [phone].     DBT Therapy Appointments:  Date: 04/13/17 VILLA Suazo  Time: 02:30 PM  Mental Health Services (DBT):  [address]  [place] [address]  Phone: [phone]  Fax: [phone]  Medical Records Fax: [phone]  Attestation:  The patient has been seen and evaluated by me,  Jewels Hartmann MD   Discharge Time>30 min  Discharged on :3/29/2017

## 2017-03-29 NOTE — PLAN OF CARE
"Problem: Depressive Symptoms  Goal: Depressive Symptoms  Signs and symptoms of listed problems will be absent or manageable.    Patient, prior to discharge, will:  -verbalize decrease in depressive signs/symptoms  -verbalize a decrease in anxiety   -verbalize an understanding of medication regimen   -verbalize absence of SI/SIB   -develop a safety plan  -identify a support system   -will participate in coordination of discharge planning    To promote safety/ mental health    Patient identified the following   Triggers:  ----------  Wellness Strategies:  ----------  Warning Signs:  ----------  Feedback (people they would like to receive feedback from if early warning signs - ex. Friends, family, partner/spouse, support groups):  ----------  Taking Action:  ----------  Ways to [place]:      Self-Reflection & Planning.  Assessed patient s progress completing forms related to Illness Management Recovery (including Personal Plan of Care, Adult Coping Plan, and My Support and Coping Plan) and assisted as needed.    Encouraged patient to continue to consider triggers, wellness strategies, early warning signs, feedback from others, actions to take to prevent relapse, and coping strategies as part of a plan to remain well after leaving the hospital.           Pt pleasant and cooperative this shift. Full range affect. Is hoping to discharge tomorrow. She has fencing tomorrow evening and states \"that would be much more beneficial to me than staying in here\". Pt received a visit from her fiance that went well. Pt denies SI and states she hasn't had any SI since after her first night here. Pt also denies any SIB thoughts or urges. Pt reports that she feels the best she has in a long time. Does report some difficulty sleeping d/t waking up from weird dreams but that this has been an ongoing issue. Pt indicates she would rather follow up with her provider outpatient for sleep disturbances. Pt motivated and goal oriented this shift " during 1:1 conversation with staff.

## 2017-03-30 ENCOUNTER — TELEPHONE (OUTPATIENT)
Dept: BEHAVIORAL HEALTH | Facility: CLINIC | Age: 22
End: 2017-03-30

## 2017-03-30 NOTE — TELEPHONE ENCOUNTER
Per notes pt was scheduled for a DA on Friday 3/31 at 9 am. However, this was removed from the schedule. Confirmed with [facility] that Victor Manuel Irizarry had indicated that the pt cancelled this appt.     ZEINAB Connor, [facility]  3/30/2017

## 2017-04-03 ASSESSMENT — ENCOUNTER SYMPTOMS
DYSPHORIC MOOD: 1
SHORTNESS OF BREATH: 0
ABDOMINAL PAIN: 0
FEVER: 0
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
## 2017-04-05 ENCOUNTER — HOSPITAL ENCOUNTER (INPATIENT)
Facility: CLINIC | Age: 22
LOS: 5 days | Discharge: HOME | DRG: 881 | End: 2017-04-10
Attending: EMERGENCY MEDICINE | Admitting: PSYCHIATRY & NEUROLOGY
Payer: COMMERCIAL

## 2017-04-05 ENCOUNTER — TELEPHONE (OUTPATIENT)
Dept: BEHAVIORAL HEALTH | Facility: CLINIC | Age: 22
End: 2017-04-05

## 2017-04-05 DIAGNOSIS — F43.21 ADJUSTMENT DISORDER WITH DEPRESSED MOOD: ICD-10-CM

## 2017-04-05 DIAGNOSIS — R12 HEARTBURN: ICD-10-CM

## 2017-04-05 DIAGNOSIS — F41.9 ANXIETY: Primary | ICD-10-CM

## 2017-04-05 DIAGNOSIS — F51.5 NIGHTMARE: ICD-10-CM

## 2017-04-05 DIAGNOSIS — R45.851 SUICIDAL IDEATION: ICD-10-CM

## 2017-04-05 LAB
ALBUMIN UR-MCNC: NEGATIVE MG/DL
AMPHETAMINES UR QL SCN: ABNORMAL
APPEARANCE UR: CLEAR
BACTERIA #/AREA URNS HPF: ABNORMAL /HPF
BARBITURATES UR QL: ABNORMAL
BENZODIAZ UR QL: ABNORMAL
BILIRUB UR QL STRIP: NEGATIVE
CANNABINOIDS UR QL SCN: ABNORMAL
COCAINE UR QL: ABNORMAL
COLOR UR AUTO: ABNORMAL
ETHANOL UR QL SCN: ABNORMAL
GLUCOSE UR STRIP-MCNC: NEGATIVE MG/DL
HGB UR QL STRIP: NEGATIVE
KETONES UR STRIP-MCNC: NEGATIVE MG/DL
LEUKOCYTE ESTERASE UR QL STRIP: NEGATIVE
MUCOUS THREADS #/AREA URNS LPF: PRESENT /LPF
NITRATE UR QL: NEGATIVE
OPIATES UR QL SCN: ABNORMAL
PH UR STRIP: 6 PH (ref 5–7)
RBC #/AREA URNS AUTO: 1 /HPF (ref 0–2)
SP GR UR STRIP: 1.01 (ref 1–1.03)
SQUAMOUS #/AREA URNS AUTO: <1 /HPF (ref 0–1)
URN SPEC COLLECT METH UR: ABNORMAL
UROBILINOGEN UR STRIP-MCNC: NORMAL MG/DL (ref 0–2)
WBC #/AREA URNS AUTO: 1 /HPF (ref 0–2)

## 2017-04-05 PROCEDURE — 90853 GROUP PSYCHOTHERAPY: CPT

## 2017-04-05 PROCEDURE — 25000132 ZZH RX MED GY IP 250 OP 250 PS 637: Performed by: EMERGENCY MEDICINE

## 2017-04-05 PROCEDURE — 25000132 ZZH RX MED GY IP 250 OP 250 PS 637: Performed by: CLINICAL NURSE SPECIALIST

## 2017-04-05 PROCEDURE — 99207 ZZC CDG-MDM COMPONENT: MEETS HIGH - UP CODED: CPT | Performed by: CLINICAL NURSE SPECIALIST

## 2017-04-05 PROCEDURE — 12400001 ZZH R&B MH UMMC

## 2017-04-05 PROCEDURE — 99285 EMERGENCY DEPT VISIT HI MDM: CPT | Mod: 25 | Performed by: EMERGENCY MEDICINE

## 2017-04-05 PROCEDURE — 99285 EMERGENCY DEPT VISIT HI MDM: CPT | Mod: Z6 | Performed by: EMERGENCY MEDICINE

## 2017-04-05 PROCEDURE — 81001 URINALYSIS AUTO W/SCOPE: CPT | Performed by: EMERGENCY MEDICINE

## 2017-04-05 PROCEDURE — 99223 1ST HOSP IP/OBS HIGH 75: CPT | Mod: AI | Performed by: CLINICAL NURSE SPECIALIST

## 2017-04-05 PROCEDURE — 80320 DRUG SCREEN QUANTALCOHOLS: CPT | Performed by: EMERGENCY MEDICINE

## 2017-04-05 PROCEDURE — 97150 GROUP THERAPEUTIC PROCEDURES: CPT | Mod: GO

## 2017-04-05 PROCEDURE — 90791 PSYCH DIAGNOSTIC EVALUATION: CPT

## 2017-04-05 PROCEDURE — 80307 DRUG TEST PRSMV CHEM ANLYZR: CPT | Performed by: EMERGENCY MEDICINE

## 2017-04-05 RX ORDER — GABAPENTIN 600 MG/1
1200 TABLET ORAL 3 TIMES DAILY
Status: DISCONTINUED | OUTPATIENT
Start: 2017-04-05 | End: 2017-04-10 | Stop reason: HOSPADM

## 2017-04-05 RX ORDER — PRAZOSIN HYDROCHLORIDE 1 MG/1
1 CAPSULE ORAL AT BEDTIME
Status: DISCONTINUED | OUTPATIENT
Start: 2017-04-05 | End: 2017-04-10 | Stop reason: HOSPADM

## 2017-04-05 RX ORDER — HYDROXYZINE HYDROCHLORIDE 25 MG/1
25-50 TABLET, FILM COATED ORAL EVERY 4 HOURS PRN
Status: DISCONTINUED | OUTPATIENT
Start: 2017-04-05 | End: 2017-04-10 | Stop reason: HOSPADM

## 2017-04-05 RX ORDER — LURASIDONE HYDROCHLORIDE 20 MG/1
40 TABLET, FILM COATED ORAL DAILY
Status: DISCONTINUED | OUTPATIENT
Start: 2017-04-05 | End: 2017-04-10 | Stop reason: HOSPADM

## 2017-04-05 RX ORDER — NICOTINE 21 MG/24HR
1 PATCH, TRANSDERMAL 24 HOURS TRANSDERMAL EVERY 24 HOURS
Status: ON HOLD | COMMUNITY
End: 2017-04-10

## 2017-04-05 RX ORDER — CITALOPRAM HYDROBROMIDE 20 MG/1
40 TABLET ORAL DAILY
Status: DISCONTINUED | OUTPATIENT
Start: 2017-04-05 | End: 2017-04-10 | Stop reason: HOSPADM

## 2017-04-05 RX ORDER — DOCUSATE SODIUM 100 MG/1
100 CAPSULE, LIQUID FILLED ORAL 2 TIMES DAILY PRN
Status: DISCONTINUED | OUTPATIENT
Start: 2017-04-05 | End: 2017-04-10 | Stop reason: HOSPADM

## 2017-04-05 RX ORDER — ESTRADIOL 2 MG/1
4 TABLET ORAL DAILY
Status: DISCONTINUED | OUTPATIENT
Start: 2017-04-05 | End: 2017-04-10 | Stop reason: HOSPADM

## 2017-04-05 RX ORDER — IBUPROFEN 600 MG/1
600 TABLET, FILM COATED ORAL EVERY 6 HOURS PRN
Status: DISCONTINUED | OUTPATIENT
Start: 2017-04-05 | End: 2017-04-10 | Stop reason: HOSPADM

## 2017-04-05 RX ORDER — ACETAMINOPHEN 325 MG/1
650 TABLET ORAL EVERY 6 HOURS PRN
Status: DISCONTINUED | OUTPATIENT
Start: 2017-04-05 | End: 2017-04-10 | Stop reason: HOSPADM

## 2017-04-05 RX ORDER — NICOTINE 21 MG/24HR
1 PATCH, TRANSDERMAL 24 HOURS TRANSDERMAL EVERY 24 HOURS
Status: DISCONTINUED | OUTPATIENT
Start: 2017-04-05 | End: 2017-04-10 | Stop reason: HOSPADM

## 2017-04-05 RX ADMIN — CITALOPRAM HYDROBROMIDE 40 MG: 20 TABLET ORAL at 08:42

## 2017-04-05 RX ADMIN — PROGESTERONE 100 MG: 100 CAPSULE ORAL at 20:35

## 2017-04-05 RX ADMIN — NICOTINE 1 PATCH: 21 PATCH, EXTENDED RELEASE TRANSDERMAL at 08:42

## 2017-04-05 RX ADMIN — PROGESTERONE 100 MG: 100 CAPSULE ORAL at 08:42

## 2017-04-05 RX ADMIN — GABAPENTIN 1200 MG: 600 TABLET, FILM COATED ORAL at 14:15

## 2017-04-05 RX ADMIN — PRAZOSIN HYDROCHLORIDE 1 MG: 1 CAPSULE ORAL at 20:35

## 2017-04-05 RX ADMIN — ESTRADIOL 4 MG: 2 TABLET ORAL at 08:43

## 2017-04-05 RX ADMIN — GABAPENTIN 1200 MG: 600 TABLET, FILM COATED ORAL at 20:34

## 2017-04-05 RX ADMIN — HYDROXYZINE HYDROCHLORIDE 50 MG: 25 TABLET ORAL at 18:27

## 2017-04-05 RX ADMIN — LURASIDONE HYDROCHLORIDE 40 MG: 20 TABLET, FILM COATED ORAL at 08:43

## 2017-04-05 RX ADMIN — GABAPENTIN 1200 MG: 600 TABLET, FILM COATED ORAL at 08:42

## 2017-04-05 ASSESSMENT — ENCOUNTER SYMPTOMS
NERVOUS/ANXIOUS: 1
ABDOMINAL PAIN: 1

## 2017-04-05 ASSESSMENT — ACTIVITIES OF DAILY LIVING (ADL)
GROOMING: INDEPENDENT
DRESS: STREET CLOTHES
GROOMING: INDEPENDENT
DRESS: STREET CLOTHES;INDEPENDENT
ORAL_HYGIENE: INDEPENDENT
LAUNDRY: WITH SUPERVISION
ORAL_HYGIENE: INDEPENDENT
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
## 2017-04-05 NOTE — IP AVS SNAPSHOT
MRN:[id number]                      After Visit Summary   4/5/2017    Ca Ding    MRN: [id number]           Patient Information     Date Of Birth          1995        Designated Caregiver       Most Recent Value    Caregiver    Will someone help with your care after discharge? yes    Name of designated caregiver unknown - has DBT appt on 4/13    Phone number of caregiver unknown    Caregiver address unknown      About your hospital stay     You were admitted on:  April 5, 2017 You last received care in the:  [facility]    You were discharged on:  April 10, 2017       Who to Call     For medical emergencies, please call 911.  For non-urgent questions about your medical care, please call your primary care provider or clinic, [phone]          Attending Provider     Provider Specialty    Ryan Carrero MD Emergency Medicine    [facility]       Primary Care Provider Office Phone # Fax #    Mahad Hillman  [phone]       [place] [address] [facility] [address]        Further instructions from your care team       Behavioral Discharge Planning and Instructions      Summary: You were admitted on 4/5/2017 to [facility] [address] [place] [address] for Depression and Suicidal Ideations.  You were treated by Debra Naegele, APRN, CNS and discharged on 04/10/2017.     Disposition: Discharged to home    Main Diagnosis: (Per EMR)  1. Major depressive disorder without psychotic features.   2. Borderline personality disorder.   3. Suicidal ideation.     Health Care Follow-up Appointments:   Medication Management  Date: April ,14th, 2017.  Time: 11:00am.  Provider: Dr. Conrad at [facility].  Address: [address] [facility] [address], [place] [address].  Phone: [phone]  [facility] has faxed the Discharge Summary and AVS to this provider at Fax: [phone].    Therapy Appointment   Date: Thursday , April 20th,  2017.  Time: 4:00pm    Provider: Kristal Cantu at [facility].  Address: [address] [facility]. [address].  Phone: [phone].  [facility] has faxed the Discharge Summary and AVS to this provider at Fax: [phone].    DBT Therapy Appointments:  Date: 04/13/17 W/ Shirley Suazo  Time: 02:30 PM  Mental Health Services (DBT):  [facility] [address]  [place] [address]  Phone: [phone]  Fax: [phone]  Medical Records Fax: [phone]  [facility] has faxed the H&P, D/C Meds, Facesheet, discharge summary and AVS to this clinic at Fax: [phone] Attn: Intake  Attend all scheduled appointments with your outpatient providers. Call at least 24 hours in advance if you need to reschedule an appointment to ensure continued access to your outpatient providers.   Major Treatments, Procedures and Findings: You were provided with: a psychiatric assessment, assessed for medical stability, medication evaluation and/or management, group therapy, art therapy, milieu management, medical interventions and skills/OT groups.    Symptoms to Report: If you experience any of the following symptoms please report them right away to your provider or to family/friends; feeling more aggressive, increased confusion, losing more sleep, mood getting worse or thoughts of suicide.    Early warning signs can include: Early warning signs that could signal a potential relapse could include but not limited to the following; increased depression or anxiety sleep disturbances increased thoughts or behaviors of suicide or self-harm  increased unusual thinking, such as paranoia or hearing voices.    Safety and Wellness: Take all medicines as directed. Make no changes unless your doctor suggests them. Follow treatment recommendations. Refrain from alcohol and non-prescribed drugs.  Ask your support system to help you reduce your access to items that could harm yourself or others. If there is a concern for  "safety, call 911..    Resources:    Crisis Intervention: 635.971.6562 or 130-914-4576 (TTY: 235.338.6346).  Call anytime for help.  National [place] on Mental Illness (www.mn.briseida.org): 397.529.7727 or 435-156-5006.  [place] Association for Children's Mental Health (www.mac.org): 753.204.9092.  Alcoholics Anonymous (www.alcoholics-anonymous.org): Check your phone book for your local chapter.  Suicide Awareness Voices of Education (SAVE) (www.save.org): 116-654-ZHCV (1048)  National Suicide Prevention Line (www.mentalhealthmn.org): 995-912-JUHR (8346)  Mental Health Consumer/Survivor Network of [place] (www.mhcsn.net): 258.876.5308 or 079-570-7518  Mental Health Association of [place] (www.mentalhealth.org): 759.522.8804 or 061-220-8717  Self- Management and Recovery Training., SMART-- Toll free: 839.591.6150  www.Tagged.Square  [facility] Crisis (COPE) Response - Adult 324 926-5606  Text 4 Life: txt \"LIFE\" to 01569 for immediate support and crisis intervention  Crisis text line: Text \"START\" to 328-062. Free, confidential, 24/7.  Crisis Intervention: 581.752.5443 or 260-247-2221. Call anytime for help.     The treatment team has appreciated the opportunity to work with you. Ca,  please take care and make your recovery a daily recovery. If you have any questions or concerns our unit number is [phone]. You will be receiving a follow-up phone call within the next three days from a representative from behavioral health. You have identified the best phone number to reach you as [phone] (home) None (work).      Pending Results     No orders found from 4/3/2017 to 4/6/2017.            Admission Information     Date & Time Department Dept. Phone    4/5/2017 [facility] [phone]      Your Vitals Were     Blood Pressure Pulse Temperature Respirations Height Weight    119/80 85 96.5  F (35.8  C) (Oral) 16 1.75 m (5' 8.9\") 86.4 kg (190 lb 6.4 oz)    Pulse Oximetry BMI (Body Mass Index)    "             99% 28.2 kg/m2          [employer] Information     [employer] gives you secure access to your electronic health record. If you see a primary care provider, you can also send messages to your care team and make appointments. If you have questions, please call your primary care clinic.  If you do not have a primary care provider, please call [phone] and they will assist you.        Care EveryWhere ID     This is your Care EveryWhere ID. This could be used by other organizations to access your [facility] medical records  [id number]           Review of your medicines      START taking        Dose / Directions    hydrOXYzine 25 MG tablet   Commonly known as:  ATARAX   Used for:  Anxiety        Dose:  25-50 mg   Take 1-2 tablets (25-50 mg) by mouth every 4 hours as needed for anxiety   Quantity:  120 tablet   Refills:  1       prazosin 1 MG capsule   Commonly known as:  MINIPRESS   Used for:  Nightmare        Dose:  1 mg   Take 1 capsule (1 mg) by mouth At Bedtime   Quantity:  30 capsule   Refills:  1         CONTINUE these medicines which have NOT CHANGED        Dose / Directions    citalopram 40 MG tablet   Commonly known as:  celeXA   Used for:  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)        Dose:  40 mg   Take 1 tablet (40 mg) by mouth daily for 21 days   Quantity:  21 tablet   Refills:  0       estradiol 2 MG tablet   Commonly known as:  ESTRACE   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        Dose:  4 mg   Take 2 tablets (4 mg) by mouth daily   Quantity:  180 tablet   Refills:  3       gabapentin 600 MG tablet   Commonly known as:  NEURONTIN   Used for:  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)        Dose:  1200 mg   Take 2 tablets (1,200 mg) by mouth 3 times daily for 21 days   Quantity:  126 tablet   Refills:  0       lurasidone 40 MG Tabs tablet   Commonly known as:  LATUDA   Used for:   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)        Dose:  40 mg   Take 1 tablet (40 mg) by mouth daily   Quantity:  21 tablet   Refills:  0       progesterone 100 MG capsule   Commonly known as:  PROMETRIUM   Used for:  Hot flashes        Dose:  100 mg   Take 1 capsule (100 mg) by mouth 2 times daily   Quantity:  28 capsule   Refills:  0         STOP taking     nicotine 21 MG/24HR 24 hr patch   Commonly known as:  NICODERM CQ                Where to get your medicines      These medications were sent to [facility] [place], [place] - [address]  [address] [address]     Phone:  [phone]     hydrOXYzine 25 MG tablet    prazosin 1 MG capsule                Protect others around you: Learn how to safely use, store and throw away your medicines at www.disposemymeds.org.             Medication List: This is a list of all your medications and when to take them. Check marks below indicate your daily home schedule. Keep this list as a reference.      Medications           Morning Afternoon Evening Bedtime As Needed    citalopram 40 MG tablet   Commonly known as:  celeXA   Take 1 tablet (40 mg) by mouth daily for 21 days   Last time this was given:  40 mg on 4/10/2017  8:19 AM                                estradiol 2 MG tablet   Commonly known as:  ESTRACE   Take 2 tablets (4 mg) by mouth daily   Last time this was given:  4 mg on 4/10/2017  8:19 AM                                   gabapentin 600 MG tablet   Commonly known as:  NEURONTIN   Take 2 tablets (1,200 mg) by mouth 3 times daily for 21 days   Last time this was given:  1,200 mg on 4/10/2017  8:19 AM                                hydrOXYzine 25 MG tablet   Commonly known as:  ATARAX   Take 1-2 tablets (25-50 mg) by mouth every 4 hours as needed for anxiety   Last time this was given:  50 mg on 4/9/2017  4:18 PM                                lurasidone 40 MG Tabs  tablet   Commonly known as:  LATUDA   Take 1 tablet (40 mg) by mouth daily   Last time this was given:  40 mg on 4/9/2017  6:31 PM                                   prazosin 1 MG capsule   Commonly known as:  MINIPRESS   Take 1 capsule (1 mg) by mouth At Bedtime   Last time this was given:  1 mg on 4/9/2017  8:34 PM                                progesterone 100 MG capsule   Commonly known as:  PROMETRIUM   Take 1 capsule (100 mg) by mouth 2 times daily   Last time this was given:  100 mg on 5/11/2017  9:32 AM

## 2017-04-05 NOTE — PROGRESS NOTES
"1. What PRN did patient receive? Hydroxyzine 50 mg    2. What was the patient doing that led to the PRN medication? Requests for increasing anxiety. Rates 10/10.    3. Did they require R/S? No    4. Side effects to PRN medication? None    5. After 1 Hour, patient appeared: Calm, watching TV in the lounge. States \"it helped a little bit.\"   "

## 2017-04-05 NOTE — PROGRESS NOTES
Attendence: Pt. Attended scheduled 2 of 3 OT sessions today.   Observations: pt had saddened affect during group, though participated for some time with minimal answers, pt requested to leave due to feeling tired. Pt remained in room for the remainder of group sessions.     04/05/17 1600   Occupational Therapy   Type of Intervention structured groups   Response Participates   Hours 1.5

## 2017-04-05 NOTE — IP AVS SNAPSHOT
[facility]    [facility] [address]    [address] [facility] [address]    Phone:  [phone]                                       After Visit Summary   4/5/2017    Ca Ding    MRN: [id number]           After Visit Summary Signature Page     I have received my discharge instructions, and my questions have been answered. I have discussed any challenges I see with this plan with the nurse or doctor.    ..........................................................................................................................................  Patient/Patient Representative Signature      ..........................................................................................................................................  Patient Representative Print Name and Relationship to Patient    ..................................................               ................................................  Date                                            Time    ..........................................................................................................................................  Reviewed by Signature/Title    ...................................................              ..............................................  Date                                                            Time

## 2017-04-05 NOTE — PROGRESS NOTES
04/05/17 0538   Patient Belongings   Did you bring any home meds/supplements to the hospital?  Yes   Disposition of meds  Sent to security/pharmacy per site process   Patient Belongings cell phone/electronics;clothing;keys;purse;shoes   Disposition of Belongings ID cards & meds to security/pharmacy. All other items in patient's storage bin.   Belongings Search Yes  (Jamarcus conducted [employer] search.)   Clothing Search Yes   Second Staff Chavo     Items sent to security in envelope #[id number]: 1 passport    Meds to security pharmacy in envelope #[id number]: 1 bottle gabapentin; 1 bottle estradiol; 1 bottle progesterone; 1 bottle latuda; 1 bottle citalopram;    Items in patient's storage bin: blue t-shirt; gray shoes; gray hooded sweatshirt; blue shorts; blue hooded sweatshirt; pink underwear; green underwear; blue underwear; coconut oil; 2 sets of cards; journal; blue shirt; gray shirt; purple shirt; 2 books; bra; jeans; pen; orange vaginal dilator; cell phone; blue backpack; purse; keys; phone ; matches;    ADMISSION:  I am responsible for any personal items that are not sent to the safe or pharmacy. [facility] is not responsible for loss, theft or damage of any property in my possession.    Patient Signature _____________________ Date/Time _____________________    Staff Signature _______________________ Date/Time _____________________    2nd Staff person, if patient is unable/unwilling to sign  ___________________________________ Date/Time _____________________    DISCHARGE:  My personal items have been returned to me.   Patient Signature _____________________ Date/Time _____________________

## 2017-04-05 NOTE — PROGRESS NOTES
"Darcie Walker -3720, contact for patient care vaginal dilator order, informed [id number] that pt reports needing one size greater than what she currently has. Pt unsure of size and needs to measure the diameter of her existing vaginal dilator in order for it to be ordered. It will need to be ordered as DME and will need to be obtained from [facility] on the [facility]. Pt in bed most of afternoon resting as didn't sleep much last night. Pt informed of urine needed and prompted for 2 pm medications and to measure dilator. Pt informed unable to order if not know current size. Pt declines to get up out of bed, \"need to rest and I already went\". Patient states dilator is in her backpack in personal supply bin. Medications brought to her and urine specimen cup left in room with large glass of water. Will have evenings follow-up to ensure pt measures dilator otherwise unable to order.  "

## 2017-04-05 NOTE — TELEPHONE ENCOUNTER
4/5/17  S: pt was bib her partner to [facility].  Pt is having suicidal ideation, thoughts to set herself on fire or jump from a bridge.  B: pt has hx of depression, borderline personality disorder, ptsd, pot abuse, and has complete gender reassignment surgery.  Pt was dc'd from here 1 week ago.  She is scheduled to start DBT on the 13th but is unable to contract for safety until then.  Pt is med-compliant.  Reports using pot.  States she is having increased mood swings.  A: pt is cooperative.  Voluntary.  R: admit janet/mely garcia to follow

## 2017-04-05 NOTE — PROGRESS NOTES
Patient was seen by OB/GYN.  OB/GYN recommended the patient measure her dilator to determine the next size needed.  Patient measured at the biggest width with the results of 76.2 mm.  The measuring was observed by writer and another staff.  However, this number does not correlate with the numbers suggested by OB.  Will wait for further suggestions.

## 2017-04-05 NOTE — PROGRESS NOTES
Case Management Note  4/5/2017    CTC met with patient to discuss post hospitalization aftercare plans and to complete an initial psych-social assessment. Initial psych-social note to follow.

## 2017-04-05 NOTE — PROGRESS NOTES
Quick Note    Asked by team to evaluate patient for needed vaginal dilator and or hormone testing.      Review of her chart performed with findings as follows:  Ca Ding is a 22 year old G0 who underwent gender affirmation surgery 3/23/2016 in [place].  She has since been seen for hormone therapy at [facility] and has a been previously seen by Dr. Marcelino and Carine at [facility] but doesn't appear to have followed up for some time.  At her last visit in January she was having some issues with pain management related to her surgery.      Regarding her dilator, patient reports that she currently has a dilator but it is too small.  She is unsure which size it is.  Discussed with her that dilators available here in the hospital are from [facility] and we would need to determine which size to obtain for her and thus need to measure her current dilator.  She is unsure what she has but knows she needs one size larger.      Prior to visiting Ca, I discussed availability with RNs in the [facility] on the [facility].  They discussed that they have dilators in stock but usually are able to order through [employer] with a couple day turn around.  They have a variety in stock, including small (22mm diameter), medium (29mm), and large (35mm) in addition to other sizes.  Should patient be able to measure her current dilator, we could consider getting a larger size from [facility].  Alternatively, should Ca have someone in the community who could provider her the appropriate size, there are shops in the [facility] that have available resources (e.g. [employer]) quickly.    Regarding her hormone levels, I reviewed with Ca that we do not routinely check levels but if she desired them that was reasonable.  She is interested in estradiol and testosterone which can be added on to any blood test.  She is currently taking estradiol 4mg daily and 100mg progesterone BID.  We would  recommend continuing on her current regimen without any abrupt changes.  Patient reports she gets her care with Dr. Hillman at [facility].  Should she have any questions or concerns regarding her medications, we would recommend discussing with Dr. Hillman.  I asked Ca and she is ok if we want to discuss her with Dr. Hillman.      Should additional questions arise, please let us know.     Discussed with Dr. Shekhar Walker MD, MS  Resident Physician-[id number]  Department of Obstetrics, Gynecology, and Women's Health  4/5/2017, 2:56 PM
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
## 2017-04-05 NOTE — PROGRESS NOTES
Patient is given a clean catch specimen cup with instructions on how to obtain a urine sample.  Will send sample to lab when available.

## 2017-04-05 NOTE — PROGRESS NOTES
SPIRITUAL HEALTH SERVICES  [facility] 4AW      REFERRAL SOURCE: [facility]    Attempted to visit Ca, but she was sleeping. She requested I come back tomorrow.    PLAN: I will f/up tomorrow.                                                                                                                                                Arline Howard M.S., M.Div.  Staff   Pager [phone]
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
## 2017-04-05 NOTE — PROGRESS NOTES
"Pt checked in feeling 'foggy.' Pt's stated goal of the day was, \"to take a nap and get familiar with the unit.\" Pt was present in the milieu and mildly social with others. Pt attended the morning groups but slept during the afternoon groups. Pt reported feeling depressed (6), sad (6), and anxious (9). Pt denies auditory and visual hallucinations. In regards to SI & SIB, pt revealed having chronic thoughts with no stated plan. Pt mentioned she would check-in with staff if these thoughts turn into urges or a plan. Some coping skills that have worked for the pt include: meditation, yoga, lavender (essential oils), card games, and reading.      04/05/17 1530   Behavioral Health   Hallucinations denies / not responding to hallucinations   Thinking poor concentration   Orientation person: oriented;place: oriented;date: oriented;time: oriented   Memory baseline memory   Insight poor   Judgement impaired   Eye Contact at examiner   Affect blunted, flat   Mood depressed;hopeless   Physical Appearance/Attire attire appropriate to age and situation   Hygiene neglected grooming - unclean body, hair, teeth   Suicidality chronic thoughts with no stated plan   Self Injury chronic thoughts with no stated plan   Activity restless   Speech clear;coherent   Medication Sensitivity no stated side effects;no observed side effects   Psychomotor / Gait balanced;steady   Safety   Suicidality status 15   Coping/Psychosocial   Verbalized Emotional State depression;hopelessness   Psycho Education   Type of Intervention 1:1 intervention   Response participates, initiates socially appropriate   Hours 0.5   Treatment Detail Warning Signs   Activities of Daily Living   Hygiene/Grooming independent   Oral Hygiene independent   Dress street clothes   Room Organization independent   Activity   Activity Level of Assistance independent   Behavioral Health Interventions   Depression maintain safety precautions;monitor need to revise level of " observation;maintain safe secure environment;assist patient in developing safety plan;assist patient in following safety plan;encourage nutrition and hydration;encourage participation / independence with adls;provide emotional support;establish therapeutic relationship;assist with developing and utilizing healthy coping strategies;build upon strengths   Social and Therapeutic Interventions (Depression) encourage socialization with peers;encourage effective boundaries with peers;encourage participation in therapeutic groups and milieu activities

## 2017-04-06 PROCEDURE — 25000132 ZZH RX MED GY IP 250 OP 250 PS 637: Performed by: CLINICAL NURSE SPECIALIST

## 2017-04-06 PROCEDURE — 90853 GROUP PSYCHOTHERAPY: CPT

## 2017-04-06 PROCEDURE — 12400001 ZZH R&B MH UMMC

## 2017-04-06 PROCEDURE — 25000132 ZZH RX MED GY IP 250 OP 250 PS 637: Performed by: EMERGENCY MEDICINE

## 2017-04-06 PROCEDURE — 99232 SBSQ HOSP IP/OBS MODERATE 35: CPT | Performed by: CLINICAL NURSE SPECIALIST

## 2017-04-06 RX ADMIN — ESTRADIOL 4 MG: 2 TABLET ORAL at 09:03

## 2017-04-06 RX ADMIN — PRAZOSIN HYDROCHLORIDE 1 MG: 1 CAPSULE ORAL at 20:21

## 2017-04-06 RX ADMIN — LURASIDONE HYDROCHLORIDE 40 MG: 20 TABLET, FILM COATED ORAL at 18:34

## 2017-04-06 RX ADMIN — CITALOPRAM HYDROBROMIDE 40 MG: 20 TABLET ORAL at 09:03

## 2017-04-06 RX ADMIN — GABAPENTIN 1200 MG: 600 TABLET, FILM COATED ORAL at 13:30

## 2017-04-06 RX ADMIN — GABAPENTIN 1200 MG: 600 TABLET, FILM COATED ORAL at 20:20

## 2017-04-06 RX ADMIN — NICOTINE 1 PATCH: 21 PATCH, EXTENDED RELEASE TRANSDERMAL at 09:03

## 2017-04-06 RX ADMIN — GABAPENTIN 1200 MG: 600 TABLET, FILM COATED ORAL at 09:03

## 2017-04-06 RX ADMIN — PROGESTERONE 100 MG: 100 CAPSULE ORAL at 09:04

## 2017-04-06 RX ADMIN — HYDROXYZINE HYDROCHLORIDE 50 MG: 25 TABLET ORAL at 20:21

## 2017-04-06 RX ADMIN — PROGESTERONE 100 MG: 100 CAPSULE ORAL at 20:21

## 2017-04-06 ASSESSMENT — ACTIVITIES OF DAILY LIVING (ADL)
DRESS: INDEPENDENT
DRESS: INDEPENDENT
ORAL_HYGIENE: INDEPENDENT
GROOMING: INDEPENDENT
ORAL_HYGIENE: INDEPENDENT
GROOMING: INDEPENDENT

## 2017-04-06 NOTE — PLAN OF CARE
Problem: General Plan of Care (Inpatient Behavioral)  Goal: Team Discussion  Team Plan:   Outcome: No Change  Behavioral Team Discussion: (4/5/2017)     Continued Stay Criteria/Rationale: Patient admitted for Depression and Suicidal Ideations.  Plan: The following services will be provided to the patient; psychiatric assessment, medication management, therapeutic milieu, individual and group support, art therapy, and skills/OT groups.   Participants: 4A Provider: Debra Naegele, APRN, CNS; 4A RN's: Sabino Posadas, RN and Maya Hammond RN; 4A CTC's: Pelon Clemons (CTC), Oksana Yeboah (CTC) and Trung Sewell OTR/L.  Summary/Recommendation: Patient admitted due to worsening symptoms of Depression and Suicidal Ideations. Providers will continue to assess today and CTC will meet with pt to complete psych-social assessment. CTC will also work with pt on assessing her aftercare plan.  Medical/Physical: Deferred (see medical notes).  Progress: No Change.

## 2017-04-06 NOTE — PROGRESS NOTES
Pt active and social in milieu, attending groups. Pleasant with staff. Unable to do check in, as pt was with provider and Arline for spirituality consult.      04/06/17 1400   Behavioral Health   Hallucinations denies / not responding to hallucinations   Thinking other (see comment)  (improved)   Orientation person: oriented;place: oriented;date: oriented;time: oriented   Memory baseline memory   Insight admits / accepts   Judgement impaired   Eye Contact at examiner   Affect blunted, flat;sad   Mood anxious;depressed   Physical Appearance/Attire attire appropriate to age and situation   Hygiene other (see comment)  (adequate)   Suicidality other (see comments)  (denies)   Self Injury other (see comment)  (denies)   Activity (attending groups, visible in milieu)   Speech coherent;clear   Medication Sensitivity no stated side effects;no observed side effects   Psychomotor / Gait balanced;steady   Psycho Education   Type of Intervention 1:1 intervention   Response participates, initiates socially appropriate   Hours 0.5   Treatment Detail Receiving feedback   Activities of Daily Living   Hygiene/Grooming independent   Oral Hygiene independent   Dress independent   Room Organization independent   Behavioral Health Interventions   Depression maintain safety precautions   Social and Therapeutic Interventions (Depression) encourage socialization with peers;encourage effective boundaries with peers;encourage participation in therapeutic groups and milieu activities

## 2017-04-06 NOTE — PROGRESS NOTES
SPIRITUAL HEALTH SERVICES  [facility] [place]      REFERRAL SOURCE: Isamar Penaloza shared that she is back in the hospital for suicidal thinking which is mostly due to having huge ups and downs in her mood and difficulty coping. We discussed her butcher beliefs, her need for community, and hopes for the DBT program she will be starting. Ca was fairly quiet and spoke only between long pauses. She couldn't think of anything in particular she needed for support, except to ask if she could bring herbs on the unit for tea. I advised her to speak to unit staff about this request.    PLAN: [facility] remains available upon request.                                                                                                                                                Arline Howard M.S., M.Div.  Staff   Pager [phone]

## 2017-04-06 NOTE — PROGRESS NOTES
Behavioral Health  Note  Behavioral Health  Spirituality Group Note    Unit 4AW    Name: Ca Ding    YOB: 1995   MRN: [id number]    Age: 22 year old    Patient attended -led group, which included discussion of spirituality, coping with illness and building resilience.  Patient attended group for 1 hrs.  The patient actively participated in group discussion    Arline Howard M.S., M.Div.  Staff   Pager [phone]

## 2017-04-06 NOTE — PROGRESS NOTES
[facility], [place]   Psychiatric Progress Note        Interim History:   The patient's care was discussed with the treatment team during the daily team meeting and/or staff's chart notes were reviewed.  Staff report patient active in milieu and attending groups.     Patient reports suicidal ideation and depression. She does not feel that she can keep herself safe is she were to discharge. She presents with a flat affect. She engage in minimal conversation.          Medications:       citalopram  40 mg Oral Daily     estradiol  4 mg Oral Daily     gabapentin  1,200 mg Oral TID     lurasidone  40 mg Oral Daily     nicotine  1 patch Transdermal Q24H     progesterone  100 mg Oral BID     nicotine   Transdermal Daily     nicotine   Transdermal Q8H     prazosin  1 mg Oral At Bedtime          Allergies:   No Known Allergies       Labs:     Recent Results (from the past 24 hour(s))   UA with Microscopic    Collection Time: 04/05/17  6:18 PM   Result Value Ref Range    Color Urine Light Yellow     Appearance Urine Clear     Glucose Urine Negative NEG mg/dL    Bilirubin Urine Negative NEG    Ketones Urine Negative NEG mg/dL    Specific Gravity Urine 1.012 1.003 - 1.035    Blood Urine Negative NEG    pH Urine 6.0 5.0 - 7.0 pH    Protein Albumin Urine Negative NEG mg/dL    Urobilinogen mg/dL Normal 0.0 - 2.0 mg/dL    Nitrite Urine Negative NEG    Leukocyte Esterase Urine Negative NEG    Source Midstream Urine     WBC Urine 1 0 - 2 /HPF    RBC Urine 1 0 - 2 /HPF    Bacteria Urine Few (A) NEG /HPF    Squamous Epithelial /HPF Urine <1 0 - 1 /HPF    Mucous Urine Present (A) NEG /LPF   Drug abuse screen 6 urine (tox)    Collection Time: 04/05/17  6:18 PM   Result Value Ref Range    Amphetamine Qual Urine  NEG     Negative   Cutoff for a negative amphetamine is 500 ng/mL or less.      Barbiturates Qual Urine  NEG     Negative   Cutoff for a negative barbiturate is 200 ng/mL or less.      Benzodiazepine  "Qual Urine  NEG     Negative   Cutoff for a negative benzodiazepine is 200 ng/mL or less.      Cannabinoids Qual Urine (A) NEG     Positive   Cutoff for a positive cannabinoid is greater than 50 ng/mL. This is an   unconfirmed screening result to be used for medical purposes only.      Cocaine Qual Urine  NEG     Negative   Cutoff for a negative cocaine is 300 ng/mL or less.      Ethanol Qual Urine  NEG     Negative   Cutoff for a negative urine ethanol is 0.05 g/dL or less      Opiates Qualitative Urine  NEG     Negative   Cutoff for a negative opiate is 300 ng/mL or less.            Psychiatric Examination:   /74  Pulse 77  Temp 96.8  F (36  C)  Resp 16  Ht 1.75 m (5' 8.9\")  Wt 84.9 kg (187 lb 2.7 oz)  SpO2 99%  BMI 27.72 kg/m2  Weight is 187 lbs 2.73 oz  Body mass index is 27.72 kg/(m^2).    Appearance: awake, alert and adequately groomed  Attitude:  guarded  Eye Contact:  good  Mood:  anxious and depressed  Affect:  intensity is blunted  Speech:  normal prosody  Psychomotor Behavior:  no evidence of tardive dyskinesia, dystonia, or tics  Throught Process:  linear  Associations:  no loose associations  Thought Content:  passive suicidal ideation present  Insight:  fair  Judgement:  fair  Oriented to:  time, person, and place  Attention Span and Concentration:  intact  Recent and Remote Memory:  intact         Precautions:     Behavioral Orders   Procedures     Code 1 - Restrict to Unit     Routine Programming     As clinically indicated     Status 15     Every 15 minutes.     Suicide precautions          DIagnoses:   1. Major depressive disorder without psychotic features.   2. Borderline personality disorder.   3. Suicidal ideation.              Plan:   LEGAL: Voluntary    Medication Management: OB/GYn reviewed her hormones and indicated they would not adjust them. Patient is being followed in an outpatient setting. Started prazosin and Trazosone.     Disposition: Patient reports feeling suicidal. "

## 2017-04-06 NOTE — PROGRESS NOTES
04/05/17 2100   Behavioral Health   Hallucinations denies / not responding to hallucinations   Thinking poor concentration   Orientation person: oriented;place: oriented;date: oriented;time: oriented   Memory baseline memory   Insight admits / accepts   Judgement impaired   Eye Contact at examiner   Affect blunted, flat   Mood anxious   Physical Appearance/Attire attire appropriate to age and situation   Hygiene neglected grooming - unclean body, hair, teeth   Suicidality other (see comments)  (denies)   Self Injury other (see comment)  (denies)   Activity withdrawn;other (see comment)  (active on milieu)   Speech clear;coherent   Medication Sensitivity no stated side effects;no observed side effects   Psychomotor / Gait balanced;steady   Activities of Daily Living   Hygiene/Grooming independent   Oral Hygiene independent   Dress street clothes;independent   Laundry with supervision   Room Organization independent   Activity   Activity Level of Assistance independent   Behavioral Health Interventions   Depression maintain safety precautions;maintain safe secure environment;encourage nutrition and hydration;provide emotional support;establish therapeutic relationship;assist with developing and utilizing healthy coping strategies;build upon strengths   Social and Therapeutic Interventions (Depression) encourage socialization with peers;encourage participation in therapeutic groups and milieu activities     Patient was very anxious, but could not discern a reason. Had a good visit with partner. Enjoys her roommate and has been fairly social. Patient became more withdrawn as evening went on, and went to bed early. Patient likes essential oils before bed. Needs help remembering to dialate after meals and patient's roommate is aware of 20 minute private time (although not why). Patient denied thoughts of suicide and self-harm. Patient ate dinner but did not shower. Pleasant and cooperative with staff, prefers female  staff.

## 2017-04-06 NOTE — PROGRESS NOTES
Attendence: Pt. Attended scheduled 1 of 2 OT sessions today.   Observations: pt did attend both groups but was in PM group too briefly to count. Pt appeared to have significantly saddened affect, arriving to group 10 minutes late and leaving after approx 5 minutes. During free/project time pt did remain in group to color.     04/06/17 1600   Occupational Therapy   Type of Intervention structured groups   Response Participates   Hours 0.5

## 2017-04-07 PROCEDURE — 90853 GROUP PSYCHOTHERAPY: CPT

## 2017-04-07 PROCEDURE — 25000132 ZZH RX MED GY IP 250 OP 250 PS 637: Performed by: EMERGENCY MEDICINE

## 2017-04-07 PROCEDURE — 12400001 ZZH R&B MH UMMC

## 2017-04-07 PROCEDURE — 99207 ZZC CDG-MDM COMPONENT: MEETS MODERATE - UP CODED: CPT | Performed by: CLINICAL NURSE SPECIALIST

## 2017-04-07 PROCEDURE — 99232 SBSQ HOSP IP/OBS MODERATE 35: CPT | Performed by: CLINICAL NURSE SPECIALIST

## 2017-04-07 PROCEDURE — 25000132 ZZH RX MED GY IP 250 OP 250 PS 637: Performed by: CLINICAL NURSE SPECIALIST

## 2017-04-07 PROCEDURE — H2032 ACTIVITY THERAPY, PER 15 MIN: HCPCS

## 2017-04-07 RX ADMIN — CITALOPRAM HYDROBROMIDE 40 MG: 20 TABLET ORAL at 09:23

## 2017-04-07 RX ADMIN — GABAPENTIN 1200 MG: 600 TABLET, FILM COATED ORAL at 14:17

## 2017-04-07 RX ADMIN — LURASIDONE HYDROCHLORIDE 40 MG: 20 TABLET, FILM COATED ORAL at 18:24

## 2017-04-07 RX ADMIN — PRAZOSIN HYDROCHLORIDE 1 MG: 1 CAPSULE ORAL at 20:52

## 2017-04-07 RX ADMIN — PROGESTERONE 100 MG: 100 CAPSULE ORAL at 09:22

## 2017-04-07 RX ADMIN — GABAPENTIN 1200 MG: 600 TABLET, FILM COATED ORAL at 20:51

## 2017-04-07 RX ADMIN — PROGESTERONE 100 MG: 100 CAPSULE ORAL at 20:51

## 2017-04-07 RX ADMIN — HYDROXYZINE HYDROCHLORIDE 50 MG: 25 TABLET ORAL at 14:17

## 2017-04-07 RX ADMIN — NICOTINE 1 PATCH: 21 PATCH, EXTENDED RELEASE TRANSDERMAL at 09:24

## 2017-04-07 RX ADMIN — ESTRADIOL 4 MG: 2 TABLET ORAL at 09:23

## 2017-04-07 RX ADMIN — GABAPENTIN 1200 MG: 600 TABLET, FILM COATED ORAL at 09:23

## 2017-04-07 RX ADMIN — Medication 25 MG: at 20:51

## 2017-04-07 ASSESSMENT — ACTIVITIES OF DAILY LIVING (ADL)
GROOMING: INDEPENDENT
DRESS: INDEPENDENT
DRESS: INDEPENDENT
ORAL_HYGIENE: INDEPENDENT
ORAL_HYGIENE: INDEPENDENT
GROOMING: INDEPENDENT

## 2017-04-07 NOTE — BRIEF OP NOTE
"I briefly spoke with Ca's visitor this evening. The visitor reported that Ca said she feels unsafe here. When asked why, the visitor responded that Ca reported that some of the male patients had been making sexist and transphobic comments in the milieu. I assured the visitor that I would address any remarks of that sort if I heard them and that they are not tolerated on the unit.    I would recommend that this could be something to address in community meeting. Psych associates could say something to the effect of \"Sexist, homophobic, and transphobic language will not be tolerated on the unit.   "

## 2017-04-07 NOTE — PROGRESS NOTES
Pt was in room during the majority of the evening besides visiting hours. Pt did not attend group or community meeting. Writer was unable to assess pt because pt was asleep during the evening hours after visit.

## 2017-04-07 NOTE — PROGRESS NOTES
04/07/17 1600   Art Therapy   Type of Intervention structured groups   Response participates with encouragement   Hours 3.5   Pt participated with both art and yoga directives. She was proactive in asking for yoga to help with her anxiety. She also worked on her self portrait and added coping skills from a long list of coping skills writer gave her. She also asked for info about DBT which Writer gave her. She says she is anxious and her anxiety is manifesting in physical back pain . She talks very softly when anxious too. Yoga and meditation really helps her and she seems invested in her own recovery.

## 2017-04-07 NOTE — PROGRESS NOTES
"[facility], [place]   Psychiatric Progress Note        Interim History:   The patient's care was discussed with the treatment team during the daily team meeting and/or staff's chart notes were reviewed.  Staff report patient attending groups.    Patient continues to report suicidal thoughts and depression.          Medications:       citalopram  40 mg Oral Daily     estradiol  4 mg Oral Daily     gabapentin  1,200 mg Oral TID     lurasidone  40 mg Oral Daily     nicotine  1 patch Transdermal Q24H     progesterone  100 mg Oral BID     nicotine   Transdermal Daily     nicotine   Transdermal Q8H     prazosin  1 mg Oral At Bedtime          Allergies:   No Known Allergies       Labs:   No results found for this or any previous visit (from the past 24 hour(s)).       Psychiatric Examination:   /74  Pulse 89  Temp 96.4  F (35.8  C) (Oral)  Resp 16  Ht 1.75 m (5' 8.9\")  Wt 84.9 kg (187 lb 2.7 oz)  SpO2 99%  BMI 27.72 kg/m2  Weight is 187 lbs 2.73 oz  Body mass index is 27.72 kg/(m^2).    Appearance: awake, alert and adequately groomed  Attitude:  cooperative  Eye Contact:  good  Mood:  depressed  Affect:  intensity is blunted  Speech:  clear, coherent  Psychomotor Behavior:  no evidence of tardive dyskinesia, dystonia, or tics  Throught Process:  linear  Associations:  no loose associations  Thought Content:  passive suicidal ideation present  Insight:  fair  Judgement:  fair  Oriented to:  time, person, and place  Attention Span and Concentration:  fair  Recent and Remote Memory:  fair         Precautions:     Behavioral Orders   Procedures     Code 1 - Restrict to Unit     Routine Programming     As clinically indicated     Status 15     Every 15 minutes.     Suicide precautions          DIagnoses:   1. Major depressive disorder without psychotic features.   2. Borderline personality disorder.   3. Suicidal ideation.              Plan:     LEGAL: Voluntary     Medication " Management: OB/GYn reviewed her hormones and indicated they would not adjust them. Patient is being followed in an outpatient setting. Started prazosin and Trazosone.      Disposition: Patient reports feeling suicidal.

## 2017-04-07 NOTE — PROGRESS NOTES
"CLINICAL NUTRITION SERVICES - ASSESSMENT NOTE     Nutrition Prescription    RECOMMENDATIONS FOR MDs/PROVIDERS TO ORDER:  None at this time.    Malnutrition Status:    Non-severe malnutrition in the context of social/environmental    Recommendations already ordered by Registered Dietitian (RD):  - Ordered Ensure Plus to be sent daily with dinner per patient request  - Clarified diet order to vegetarian - fish okay, as patient stated she was not receiving vegetarian menu.    Future/Additional Recommendations:  - Monitor weights and intakes       REASON FOR ASSESSMENT  Ca Ding is a/an 22 year old female assessed by the dietitian for RN Consult - \"Pt would like Ensure\"    NUTRITION HISTORY  Patient states since she was 17 years old, she has had a hard time affording food and would consume foods such as poptarts, pizza rolls, etc. Does not report a decreased appetite, however relates decreased intakes to limited availability of food.    CURRENT NUTRITION ORDERS  Diet: Vegetarian - fish okay  Intake/Tolerance: Patient eating 3 meals per day here and reports eating 100%. Requested Ensure and states she needs to eat 350 calories with her medication that she is taking. Patient states likely to eat well here.    LABS  Labs reviewed    MEDICATIONS  Medications reviewed  celexa    ANTHROPOMETRICS  Height: 175 cm (5' 8.898\")  Most Recent Weight: 84.9 kg (187 lb 2.7 oz)    IBW: 66 kg (145 lb)  BMI: Overweight BMI 25-29.9  Weight History: Patient does not report any recent changes in weight and states UBW is around 185 lbs. Per documented weights, patient has had 21 lb (10%) weight loss in 6 months. Weight does seem to fluctuate over the past year.    Wt Readings from Last 10 Encounters:   04/06/17 84.9 kg (187 lb 2.7 oz)   03/28/17 86 kg (189 lb 9.5 oz)   09/29/16 94.7 kg (208 lb 12.8 oz)   09/15/16 95.3 kg (210 lb 3.2 oz)   08/08/16 94.5 kg (208 lb 6.4 oz)   06/22/16 88.5 kg (195 lb)   06/02/16 89 kg (196 lb 3.2 oz) "   05/25/16 91.5 kg (201 lb 12.8 oz)   05/24/16 87.1 kg (192 lb)   05/03/16 87.1 kg (192 lb)       Dosing Weight: 71 kg (adjusted body weight)    ASSESSED NUTRITION NEEDS  Estimated Energy Needs: 2314-5402 kcals/day (25 - 30 kcals/kg)  Justification: Maintenance  Estimated Protein Needs: 57-71 grams protein/day (0.8 - 1 grams of pro/kg)  Justification: Maintenance  Estimated Fluid Needs: (1 mL/kcal)   Justification: Maintenance    PHYSICAL FINDINGS  See malnutrition section below.    MALNUTRITION  % Intake: < 75% for >/= 3 months (non-severe)  % Weight Loss: Up to 10% in 6 months (non-severe)  Subcutaneous Fat Loss: None observed  Muscle Loss: None observed  Fluid Accumulation/Edema: None noted  Malnutrition Diagnosis: Non-severe malnutrition in the context of social/environmental    NUTRITION DIAGNOSIS  Predicted inadequate nutrient intake related to decreased PO intake 2/2 limited availability of food with inability to afford food as evidenced by patient report of eating 1-2 times per day since she was 17 years old and documented weight loss of 21 lb (10%) over the past 6 months.      INTERVENTIONS  Implementation  Clarified diet order to vegetarian with fish okay  Multi-trace element supplement therapy - ordered Ensure Plus QD per patient preference - did not want additional snacks sent  Discussed nutrition history and PO intake since admission.    Goals  Patient to consume % of nutritionally adequate meal trays TID, or the equivalent with supplements/snacks.     Monitoring/Evaluation  Progress toward goals will be monitored and evaluated per protocol.    Francheska Meier RD, LD  [phone]

## 2017-04-07 NOTE — PROGRESS NOTES
Pt attending groups, active and social in milieu. Attended community meeting, hopeful but anxious. Successfully completed goal of not napping today. Appeared calm throughout shift.     04/07/17 1400   Behavioral Health   Hallucinations denies / not responding to hallucinations   Thinking (improved)   Orientation person: oriented;place: oriented;date: oriented;time: oriented   Memory baseline memory   Insight admits / accepts   Judgement impaired   Eye Contact at examiner;at floor   Affect blunted, flat   Mood anxious;shame/guilt;depressed   Physical Appearance/Attire attire appropriate to age and situation   Hygiene (adequate)   Suicidality (none stated)   Self Injury other (see comment)  (none stated)   Activity (attending groups, visible in miliue)   Speech coherent;clear   Medication Sensitivity no observed side effects;no stated side effects   Psychomotor / Gait steady;balanced   Psycho Education   Type of Intervention 1:1 intervention   Response participates, initiates socially appropriate   Hours 0.5   Activities of Daily Living   Hygiene/Grooming independent   Oral Hygiene independent   Dress independent   Room Organization independent

## 2017-04-08 PROCEDURE — 12400001 ZZH R&B MH UMMC

## 2017-04-08 PROCEDURE — 25000132 ZZH RX MED GY IP 250 OP 250 PS 637: Performed by: CLINICAL NURSE SPECIALIST

## 2017-04-08 PROCEDURE — 25000132 ZZH RX MED GY IP 250 OP 250 PS 637: Performed by: EMERGENCY MEDICINE

## 2017-04-08 PROCEDURE — H2032 ACTIVITY THERAPY, PER 15 MIN: HCPCS

## 2017-04-08 RX ADMIN — PROGESTERONE 100 MG: 100 CAPSULE ORAL at 21:05

## 2017-04-08 RX ADMIN — NICOTINE 1 PATCH: 21 PATCH, EXTENDED RELEASE TRANSDERMAL at 08:03

## 2017-04-08 RX ADMIN — PRAZOSIN HYDROCHLORIDE 1 MG: 1 CAPSULE ORAL at 21:05

## 2017-04-08 RX ADMIN — GABAPENTIN 1200 MG: 600 TABLET, FILM COATED ORAL at 21:04

## 2017-04-08 RX ADMIN — CITALOPRAM HYDROBROMIDE 40 MG: 20 TABLET ORAL at 08:02

## 2017-04-08 RX ADMIN — ESTRADIOL 4 MG: 2 TABLET ORAL at 08:02

## 2017-04-08 RX ADMIN — PROGESTERONE 100 MG: 100 CAPSULE ORAL at 08:02

## 2017-04-08 RX ADMIN — GABAPENTIN 1200 MG: 600 TABLET, FILM COATED ORAL at 08:02

## 2017-04-08 RX ADMIN — HYDROXYZINE HYDROCHLORIDE 50 MG: 25 TABLET ORAL at 18:39

## 2017-04-08 RX ADMIN — GABAPENTIN 1200 MG: 600 TABLET, FILM COATED ORAL at 13:03

## 2017-04-08 RX ADMIN — LURASIDONE HYDROCHLORIDE 40 MG: 20 TABLET, FILM COATED ORAL at 18:40

## 2017-04-08 ASSESSMENT — ACTIVITIES OF DAILY LIVING (ADL)
ORAL_HYGIENE: INDEPENDENT
GROOMING: INDEPENDENT
DRESS: INDEPENDENT
GROOMING: INDEPENDENT
ORAL_HYGIENE: INDEPENDENT
DRESS: INDEPENDENT

## 2017-04-08 NOTE — PLAN OF CARE
"Problem: Depressive Symptoms  Goal: Depressive Symptoms  Signs and symptoms of listed problems will be absent or manageable.    Patient, prior to discharge, will:  -verbalize decrease in depressive signs/symptoms  -verbalize a decrease in anxiety   -verbalize an understanding of medication regimen   -verbalize absence of SI/SIB   -develop a safety plan  -identify a support system   -will participate in coordination of discharge planning    To promote safety/ mental health    Patient identified the following   Triggers:  ----------  Wellness Strategies:  ----------  Warning Signs:  ----------  Feedback (people they would like to receive feedback from if early warning signs - ex. Friends, family, partner/spouse, support group members):  ----------  Taking Action:  ----------  Ways to [place]:      Self-Reflection & Planning.  Assessed patient s progress completing forms related to Illness Management Recovery (including Personal Plan of Care, Adult Coping Plan, and My Support and Coping Plan) and assisted as needed.    Encouraged patient to continue to consider triggers, wellness strategies, early warning signs, feedback from others, actions to take to prevent relapse, and coping strategies as part of a plan to remain well after leaving the hospital.           Outcome: No Change    04/07/17 5303   Depressive Symptoms   Depressive Symptoms Assessed all   Depressive Symptoms Present thought process;insight;sleep;suicidality;self injury;mood;anxiety;affect         48Hour     Goal for the day:  To stay up until bedtime.  Patient reports success in today's goal.  Patient reports taking daily naps and would like to stay up until bed time.  Patient did not take a nap.  Patient verbalized not being tired.  Patient also reports adequate nutrition intake.  Patient denies SI, SIB, and hallucinations.  Patient reports depression being \"really low\" and anxiety \"pretty high.\"  Patient reports anxiety was high prior to admission.  " Patient reports high anxiety due to the challenges and remaining sober, when her friends and partner uses drugs.  Patient reports increased loneliness.  Patient is working to identify activities that she can do alone or improves the chance to meet people who are sober and decreases her loneliness.  Patient remains positive and has good insight into her mental illness.  Patient is to remain on status 15.  Staff is to continue to build on patients strength.

## 2017-04-08 NOTE — PROGRESS NOTES
Pt had a visitor, attending groups, active and social in milieu. Pleasant upon approach. Concerned about the way that others are seeing her because of her transgender status. Worried about there being some bias against her status. Discussed ways to make the milieu feel more safe.       04/08/17 1400   Behavioral Health   Hallucinations denies / not responding to hallucinations   Thinking intact   Orientation person: oriented;place: oriented;date: oriented;time: oriented   Memory baseline memory   Insight admits / accepts;insight appropriate to situation   Judgement (improved)   Eye Contact at examiner   Affect full range affect   Mood anxious   Physical Appearance/Attire attire appropriate to age and situation   Hygiene well groomed   Suicidality (none stated)   Self Injury other (see comment)  (none stated)   Activity other (see comment)  (active, social and visible in milieu)   Speech clear;coherent   Medication Sensitivity no observed side effects;no stated side effects   Psychomotor / Gait balanced;steady   Psycho Education   Type of Intervention 1:1 intervention   Hours 0.5   Activities of Daily Living   Hygiene/Grooming independent   Oral Hygiene independent   Dress independent   Room Organization independent

## 2017-04-09 PROCEDURE — 12400001 ZZH R&B MH UMMC

## 2017-04-09 PROCEDURE — 25000132 ZZH RX MED GY IP 250 OP 250 PS 637: Performed by: EMERGENCY MEDICINE

## 2017-04-09 PROCEDURE — H2032 ACTIVITY THERAPY, PER 15 MIN: HCPCS

## 2017-04-09 PROCEDURE — 25000132 ZZH RX MED GY IP 250 OP 250 PS 637: Performed by: CLINICAL NURSE SPECIALIST

## 2017-04-09 PROCEDURE — 90853 GROUP PSYCHOTHERAPY: CPT

## 2017-04-09 RX ADMIN — ESTRADIOL 4 MG: 2 TABLET ORAL at 07:59

## 2017-04-09 RX ADMIN — PRAZOSIN HYDROCHLORIDE 1 MG: 1 CAPSULE ORAL at 20:34

## 2017-04-09 RX ADMIN — Medication 25 MG: at 20:33

## 2017-04-09 RX ADMIN — PROGESTERONE 100 MG: 100 CAPSULE ORAL at 20:34

## 2017-04-09 RX ADMIN — GABAPENTIN 1200 MG: 600 TABLET, FILM COATED ORAL at 20:34

## 2017-04-09 RX ADMIN — LURASIDONE HYDROCHLORIDE 40 MG: 20 TABLET, FILM COATED ORAL at 18:31

## 2017-04-09 RX ADMIN — PROGESTERONE 100 MG: 100 CAPSULE ORAL at 07:59

## 2017-04-09 RX ADMIN — CITALOPRAM HYDROBROMIDE 40 MG: 20 TABLET ORAL at 07:59

## 2017-04-09 RX ADMIN — GABAPENTIN 1200 MG: 600 TABLET, FILM COATED ORAL at 07:59

## 2017-04-09 RX ADMIN — NICOTINE 1 PATCH: 21 PATCH, EXTENDED RELEASE TRANSDERMAL at 07:59

## 2017-04-09 RX ADMIN — HYDROXYZINE HYDROCHLORIDE 50 MG: 25 TABLET ORAL at 16:18

## 2017-04-09 RX ADMIN — GABAPENTIN 1200 MG: 600 TABLET, FILM COATED ORAL at 14:08

## 2017-04-09 ASSESSMENT — ACTIVITIES OF DAILY LIVING (ADL)
DRESS: STREET CLOTHES;INDEPENDENT
GROOMING: INDEPENDENT
DRESS: STREET CLOTHES;INDEPENDENT
ORAL_HYGIENE: INDEPENDENT
GROOMING: HANDWASHING;SHOWER;INDEPENDENT
ORAL_HYGIENE: INDEPENDENT

## 2017-04-09 NOTE — PROGRESS NOTES
04/08/17 2200   Behavioral Health   Hallucinations denies / not responding to hallucinations   Thinking intact   Orientation person: oriented;place: oriented;time: oriented   Memory baseline memory   Insight admits / accepts   Judgement (kobe)   Eye Contact at examiner   Affect full range affect   Mood anxious   Physical Appearance/Attire attire appropriate to age and situation   Hygiene well groomed   Suicidality other (see comments)  (not stated or observed)   Self Injury other (see comment)  (not stated or observed)   Activity other (see comment)  (visible in milieu)   Speech clear   Medication Sensitivity no stated side effects   Psychomotor / Gait balanced;steady     Ca spent the majority of the evening in the milieu.  She participated in community meeting and movement group.  She had a nice visit with her partner and friend.  SI/SIB was not stated or observed.  She did not report any other concerns.

## 2017-04-09 NOTE — PLAN OF CARE
Problem: General Plan of Care (Inpatient Behavioral)  Goal: Individualization/Patient Specific Goal (IP Behavioral)  The patient and/or their representative will achieve their patient-specific goals related to the plan of care.    The patient-specific goals include:    Illness Management Recovery model: Objectives  Patient will identify reason(s) for hospitalization from their perspective.  Patient will identify a minimum of three goals for discharge.  Patient will identify a minimum of three triggers that may increase their symptoms.  Patient will identify a minimum of three coping skills they can do to stay well.   Patient will identify their support system to demonstrate readiness for discharge.    Illness Management & Recovery assists patient to develop relapse prevention as  patient identifies triggers for relapse.  patient identifies a general wellness strategy.  patient identifies the warning signs that they are in danger of relapse.  patient identifies someone they count on to get feedback .  patient identifies ways to take action when in danger of relapse.  patient identifies way to cope with stress or other symptoms.   patient participates in self-reflection.   Illness Management Recovery model:  Self-Reflection & Planning.     Assessed patient's progress completing forms related to Illness Management Recovery (including Personal Plan of Care, Adult Coping Plan, and My Support and Coping Plan) and assisted as needed.     Encouraged patient to continue to consider triggers, wellness strategies, early warning signs, feedback from others, actions to take to prevent relapse, and coping strategies as part of a plan to remain well after leaving the hospital.     1. Meditation  2. Yoga  3. Painting

## 2017-04-09 NOTE — PROVIDER NOTIFICATION
04/09/17 1621   Significant Event   Significant Event Other (see comments)     Patient approached writer for a PRN hydroxyzine d/t increased anxiety.  Patient stated that she is not doing well after getting off of the telephone with estevan who stated that she will not be here to visit this evening.  Patient stated, she was looking forward to the visit after having a stressful day.

## 2017-04-09 NOTE — PLAN OF CARE
"Problem: Depressive Symptoms  Goal: Depressive Symptoms  Signs and symptoms of listed problems will be absent or manageable.    Patient, prior to discharge, will:  -verbalize decrease in depressive signs/symptoms  -verbalize a decrease in anxiety   -verbalize an understanding of medication regimen   -verbalize absence of SI/SIB   -develop a safety plan  -identify a support system   -will participate in coordination of discharge planning    To promote safety/ mental health    Patient identified the following   Triggers:  ----------  Wellness Strategies:  ----------  Warning Signs:  ----------  Feedback (people they would like to receive feedback from if early warning signs - ex. Friends, family, partner/spouse, support group members):  ----------  Taking Action:  ----------  Ways to [place]:      Self-Reflection & Planning.  Assessed patient s progress completing forms related to Illness Management Recovery (including Personal Plan of Care, Adult Coping Plan, and My Support and Coping Plan) and assisted as needed.    Encouraged patient to continue to consider triggers, wellness strategies, early warning signs, feedback from others, actions to take to prevent relapse, and coping strategies as part of a plan to remain well after leaving the hospital.           Pt's general appearance is a bit brighter today, vs past couple of days. She reports her mood is \"pretty good.\" She said her sleep is still poor-is having nightmares, though is on prazosin. She denies si; rates her depression 3, anxiety 6. Pt said she thinks she'll be dc'd home tomorrow; lives with fiance. She plans to attend [facility] for a few weeks, before returning to work at the [employer]. Pt is in the milieu; is a bit social, and attends groups.    1415) Pt said she was afraid to take a nap-thinking she will have nightmares during the day, also. She has agreed to talk to her provider more about this tomorrow. She said she is not sure if she will be discharging " tomorrow. Reassurance given.

## 2017-04-10 VITALS
WEIGHT: 190.4 LBS | RESPIRATION RATE: 16 BRPM | HEART RATE: 85 BPM | SYSTOLIC BLOOD PRESSURE: 119 MMHG | DIASTOLIC BLOOD PRESSURE: 80 MMHG | HEIGHT: 69 IN | OXYGEN SATURATION: 99 % | BODY MASS INDEX: 28.2 KG/M2 | TEMPERATURE: 96.5 F

## 2017-04-10 PROCEDURE — 99239 HOSP IP/OBS DSCHRG MGMT >30: CPT | Performed by: CLINICAL NURSE SPECIALIST

## 2017-04-10 PROCEDURE — 25000132 ZZH RX MED GY IP 250 OP 250 PS 637: Performed by: EMERGENCY MEDICINE

## 2017-04-10 PROCEDURE — H2032 ACTIVITY THERAPY, PER 15 MIN: HCPCS

## 2017-04-10 RX ORDER — PRAZOSIN HYDROCHLORIDE 1 MG/1
1 CAPSULE ORAL AT BEDTIME
Qty: 30 CAPSULE | Refills: 1 | Status: SHIPPED | OUTPATIENT
Start: 2017-04-10

## 2017-04-10 RX ORDER — HYDROXYZINE HYDROCHLORIDE 25 MG/1
25-50 TABLET, FILM COATED ORAL EVERY 4 HOURS PRN
Qty: 120 TABLET | Refills: 1 | Status: SHIPPED | OUTPATIENT
Start: 2017-04-10

## 2017-04-10 RX ADMIN — CITALOPRAM HYDROBROMIDE 40 MG: 20 TABLET ORAL at 08:19

## 2017-04-10 RX ADMIN — ESTRADIOL 4 MG: 2 TABLET ORAL at 08:19

## 2017-04-10 RX ADMIN — PROGESTERONE 100 MG: 100 CAPSULE ORAL at 08:19

## 2017-04-10 RX ADMIN — NICOTINE 1 PATCH: 21 PATCH, EXTENDED RELEASE TRANSDERMAL at 08:19

## 2017-04-10 RX ADMIN — GABAPENTIN 1200 MG: 600 TABLET, FILM COATED ORAL at 08:19

## 2017-04-10 ASSESSMENT — ACTIVITIES OF DAILY LIVING (ADL)
GROOMING: INDEPENDENT
LAUNDRY: WITH SUPERVISION
DRESS: STREET CLOTHES
ORAL_HYGIENE: INDEPENDENT

## 2017-04-10 NOTE — PROGRESS NOTES
"   04/09/17 2100   Art Therapy   Type of Intervention structured groups   Response participates with cues/redirection   Hours 4   groups DBT house  Holistic Health  Exercise/Meditative Coloring  Pt was engaged all day, but she did have significant highs and lows. Lows tend to come after a friend or family cant make it for a visit, she almost shuts down, she has trouble regulating due to Borderline traits. She also asked writer to talk. She is very sensitive to \"trans phobia\" and was triggered by some of the males, she felt saying things that bothered her. Writer worked with her on not reacting to everything and giving energy away to every transgression. Writer worked a lot with her today on visioning her future goals through art. She alos really enjoyed holistic health group. At exercise time, she refused because of a \" disappointment\" and writer believes she asked for anxiety medications at that point in the day.  "

## 2017-04-10 NOTE — DISCHARGE INSTRUCTIONS
Behavioral Discharge Planning and Instructions      Summary: You were admitted on 3/24/2017 to Station [address] [place] [address] for Depression and Suicidal Ideations.  You were treated by Dr. Jewels Hartmann MD and discharged on 03/29/17.     Disposition: Discharged to home    Main Diagnosis: (Per EMR)  1. Adjustment disorder with depressed mood  2. Borderline Personality Disorder  3. Hallucinogen use disorder (mescaline, acid, radha, mushroom)  4. Cannabis use disorder  5. Nicotine use disorder    Health Care Follow-up Appointments:   Medication Management  Date: April ,14th, 2017.  Time: 11:00am.  Provider: Dr. Conrad at [facility].  Address: [address] [facility] [address] [address], [place] [address].  Phone: [phone]  [facility] has faxed the Discharge Summary and AVS to this provider at Fax: [phone].    Therapy Appointment   Date: Thursday , April 20th, 2017.  Time: 4:00pm    Provider: Kristal Cantu at [facility] .  Address: [address] [place]. [facility].  Phone: [phone].  [facility] has faxed the Discharge Summary and AVS to this provider at Fax: [phone].    DBT Therapy Appointments:  Date: 04/13/17 W/ Shirley Suazo  Time: 02:30 PM  Mental Health Services (DBT):  [address], [address]  [place] [address]  Phone: [phone]  Fax: [phone]  Medical Records Fax: [phone]  [facility] has faxed the H&P, D/C Meds, Facesheet, discharge summary and AVS to this clinic at Fax: [phone] Attn: Intake    Attend all scheduled appointments with your outpatient providers. Call at least 24 hours in advance if you need to reschedule an appointment to ensure continued access to your outpatient providers.   Major Treatments, Procedures and Findings: You were provided with: a psychiatric assessment, assessed for medical stability, medication evaluation and/or management, group therapy, art therapy, milieu management, medical interventions and skills/OT groups.    Symptoms to Report: If  "you experience any of the following symptoms please report them right away to your provider or to family/friends; feeling more aggressive, increased confusion, losing more sleep, mood getting worse or thoughts of suicide.    Early warning signs can include: Early warning signs that could signal a potential relapse could include but not limited to the following; increased depression or anxiety sleep disturbances increased thoughts or behaviors of suicide or self-harm  increased unusual thinking, such as paranoia or hearing voices.    Safety and Wellness: Take all medicines as directed. Make no changes unless your doctor suggests them. Follow treatment recommendations. Refrain from alcohol and non-prescribed drugs.  Ask your support system to help you reduce your access to items that could harm yourself or others. If there is a concern for safety, call 911..    Resources:    Crisis Intervention: 139.994.1371 or 302-006-3357 (TTY: 429.231.3796).  Call anytime for help.  National [place] on Mental Illness (www.[place].briseida.org): 686.217.7172 or 114-467-1894.  [place] Association for Children's Mental Health (www.macmh.org): 969.308.6798.  Alcoholics Anonymous (www.alcoholics-anonymous.org): Check your phone book for your local chapter.  Suicide Awareness Voices of Education (SAVE) (www.save.org): 433-652-NVZK (7767)  National Suicide Prevention Line (www.mentalhealthmn.org): 719-468-EHNA (7232)  Mental Health Consumer/Survivor Network of [place] (www.mhcsn.net): 238.350.5733 or 728-374-8562  Mental Health Association of [place] (www.mentalhealth.org): 132.870.4293 or 137-489-9618  Self- Management and Recovery Training., SMART-- Toll free: 723.728.9768  www.[employer].org  [facility] Crisis (COPE) Response - Adult 118 729-7511  Text 4 Life: txt \"LIFE\" to 06654 for immediate support and crisis intervention  Crisis text line: Text \"START\" to 362-039. Free, confidential, 24/7.  Crisis Intervention: 442.718.8351 or 678-006-8205. Call " anytime for help.     The treatment team has appreciated the opportunity to work with you. Ca,  please take care and make your recovery a daily recovery. If you have any questions or concerns our unit number is [phone]. You will be receiving a follow-up phone call within the next three days from a representative from behavioral health. You have identified the best phone number to reach you as [phone] (home) None (work).

## 2017-04-10 NOTE — PROGRESS NOTES
"   04/10/17 1700   General Information   Art Directive house-tree-person   Task Orientation    Task orientation skills calm and focused;works independently   Social Interaction   Social interaction skills responds to limits ;responds to therapist   Social interaction concerns inappropriate boundaries   Product/Content   Product/Content image reflects positive aspects;image reflects current feelings;positive self-statement;presence of a metaphor/theme;has own expressive language   Developmental level   Approximate developmental level of art expression age appropriate expression;age appropriate motor skills   A very tiny detailed drawing that she says is her. She likes the simple home but doesn't like the clutter which she talks about in conversation also. She talks about being overwhelmed by clutter to the point of not being able to focus on coping skills. The tree\" requires a lot of work, water, time and patience.\" The person is her \" practicing her craft\". 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 She feels good connecting with nature. She has passions but she doesn't like that her BPD Borderline Personality Disorder scares people. She says she needs support as she is trying to get better.  She made items for her intentions such as a box for her herbal tinctures. She also made a nolan \" cauldron\". She describes herself as [place], trans gender and polyamorous and she states this often. She is very sensitive to not feeling accepted.  "

## 2017-04-10 NOTE — DISCHARGE INSTRUCTIONS
Behavioral Discharge Planning and Instructions      Summary: You were admitted on 4/5/2017 to Station [address] [place] [address] for Depression and Suicidal Ideations.  You were treated by Debra Naegele, APRN, CNS and discharged on 04/10/2017.     Disposition: Discharged to home    Main Diagnosis: (Per EMR)  1. Major depressive disorder without psychotic features.   2. Borderline personality disorder.   3. Suicidal ideation.     Health Care Follow-up Appointments:   Medication Management  Date: April ,14th, 2017.  Time: 11:00am.  Provider: Dr. Conrad at [facility].  Address: [address] [facility] [address], [place] [address].  Phone: [phone]  [facility] has faxed the Discharge Summary and AVS to this provider at Fax: [phone].    Therapy Appointment   Date: Thursday , April 20th, 2017.  Time: 4:00pm    Provider: Kristal Cantu at [facility].  Address: [address] [place]. [address].  Phone: [phone].  [facility] has faxed the Discharge Summary and AVS to this provider at Fax: [phone].    DBT Therapy Appointments:  Date: 04/13/17 W/ Shirley Suazo  Time: 02:30 PM  Mental Health Services (DBT):  [address]  [place] [address]  Phone: [phone]  Fax: [phone]  Medical Records Fax: [phone]  The [facility] has faxed the H&P, D/C Meds, Facesheet, discharge summary and AVS to this clinic at Fax: [phone] Attn: Intake  Attend all scheduled appointments with your outpatient providers. Call at least 24 hours in advance if you need to reschedule an appointment to ensure continued access to your outpatient providers.   Major Treatments, Procedures and Findings: You were provided with: a psychiatric assessment, assessed for medical stability, medication evaluation and/or management, group therapy, art therapy, milieu management, medical interventions and skills/OT groups.    Symptoms to Report: If you experience any of the following symptoms please report them right away to  "your provider or to family/friends; feeling more aggressive, increased confusion, losing more sleep, mood getting worse or thoughts of suicide.    Early warning signs can include: Early warning signs that could signal a potential relapse could include but not limited to the following; increased depression or anxiety sleep disturbances increased thoughts or behaviors of suicide or self-harm  increased unusual thinking, such as paranoia or hearing voices.    Safety and Wellness: Take all medicines as directed. Make no changes unless your doctor suggests them. Follow treatment recommendations. Refrain from alcohol and non-prescribed drugs.  Ask your support system to help you reduce your access to items that could harm yourself or others. If there is a concern for safety, call 911..    Resources:    Crisis Intervention: 412.165.4891 or 675-763-3623 (TTY: 406.322.7553).  Call anytime for help.  National [place] on Mental Illness (www.mn.briseida.org): 673.177.3717 or 213-993-5109.  [place] Association for Children's Mental Health (www.macmh.org): 351.334.2450.  Alcoholics Anonymous (www.alcoholics-anonymous.org): Check your phone book for your local chapter.  Suicide Awareness Voices of Education (SAVE) (www.save.org): 332-138-QIFH (4883)  National Suicide Prevention Line (www.mentalhealthmn.org): 060-860-STMM (6030)  Mental Health Consumer/Survivor Network of [place] (www.mhcsn.net): 151.580.1164 or 720-154-8529  Mental Health Association [place] (www.mentalhealth.org): 688.771.5607 or 502-857-1063  Self- Management and Recovery Training., SMART-- Toll free: 276.665.8917  www.true[x] Media.org  [facility] Crisis (COPE) Response - Adult 593 043-1633  Text 4 Life: txt \"LIFE\" to 04847 for immediate support and crisis intervention  Crisis text line: Text \"START\" to 188-383. Free, confidential, 24/7.  Crisis Intervention: 942.339.7929 or 564-250-9056. Call anytime for help.     The treatment team has appreciated the opportunity to " work with you. Ca,  please take care and make your recovery a daily recovery. If you have any questions or concerns our unit number is [phone]. You will be receiving a follow-up phone call within the next three days from a representative from behavioral health. You have identified the best phone number to reach you as [phone] (home) None (work).

## 2017-04-10 NOTE — PLAN OF CARE
Problem: Depressive Symptoms  Goal: Depressive Symptoms  Signs and symptoms of listed problems will be absent or manageable.    Patient, prior to discharge, will:  -verbalize decrease in depressive signs/symptoms  -verbalize a decrease in anxiety   -verbalize an understanding of medication regimen   -verbalize absence of SI/SIB   -develop a safety plan  -identify a support system   -will participate in coordination of discharge planning    To promote safety/ mental health    Patient identified the following   Triggers:  ----------  Wellness Strategies:  ----------  Warning Signs:  ----------  Feedback (people they would like to receive feedback from if early warning signs - ex. Friends, family, partner/spouse, support group members):  ----------  Taking Action:  ----------  [employer]:  4/9/2017- Essential oils and water colors.       Self-Reflection & Planning.    4/9/2017-Patient reports self reflection since admission. Patient has identified coping skills, therefore, her immediate plan upon discharge is to purchase essential oils and water color material.     Assessed patient s progress completing forms related to Illness Management Recovery (including Personal Plan of Care, Adult Coping Plan, and My Support and Coping Plan) and assisted as needed.    Encouraged patient to continue to consider triggers, wellness strategies, early warning signs, feedback from others, actions to take to prevent relapse, and coping strategies as part of a plan to remain well after leaving the hospital.           Outcome: No Change    04/09/17 4251   Depressive Symptoms   Depressive Symptoms Assessed all   Depressive Symptoms Present mood;anxiety;insight;thought process;sleep      Patients goal for the day was to get through today as fast as possible.  Patient is hoping to discharge to her fiance tomorrow.  Patient reports anxiety 6/10 with anxiety d/t thoughts of discharge.  Patient requested her meds earlier this evening shift  and retired to bed at 2000.  Patient denies SI and SIB but will remain on status 15 until discharge.

## 2017-04-10 NOTE — PROGRESS NOTES
Patient discharging 4/10/2017 and destination is home.    Discharge paperwork and medications reviewed with patient who verbalizes understanding.     Copies provided: AVS X      Med Rec Xx MedsX  Security X   Locker DX     DISCHARGE FLOW SHEET: X    CARE PLAN COMPLETE: X    EDUCATION COMPLETE: x    Illness Management Recovery model: Personal Plan of Care    Patient completed Personal Plan of Care, identifying reasons for hospitalization and goals for discharge. Form reviewed in team meeting  by patient, physician, writer and RN. Form given to [facility] to be scanned into EPIC.    Survey provided.

## 2017-04-11 NOTE — DISCHARGE SUMMARY
Psychiatric Discharge Summary    Ca Ding MRN# [id number]   Age: 22 year old YOB: 1995     Date of Admission:  4/5/2017  Date of Discharge:  4/10/2017 12:31 PM  Admitting Physician:  He Longoria MD  Discharge Physician:  Debra Naegele APRN, CNS  (Contact: [phone])         Event Leading to Hospitalization:   Ca Ding is a 22-year-old female with a history of anxiety, depression and PTSD. She is presenting to the ED with suicidal ideation with plan to set herself on fire or jump off a bridge. The patient states that she was overcome with suicidal thinking after her discharge. She recently was discharged from [facility] on 03/29. The patient was brought into the ED by her parents and her partner due to their concern for her safety. The patient was scheduled to start DBT on 04/13. She did not feel that she would be able to keep herself safe until that date. The patient reports that she was taking her prescribed medications. She says that she used cannabis twice which she felt was a big improvement for her. She is trying to stop smoking marijuana.            See Admission note by Debra Naegele APRN, CNS found on 4/5/2017 for additional details.          DIagnoses:   1. Major depressive disorder without psychotic features.   2. Borderline personality disorder.          Labs:     Results for orders placed or performed during the hospital encounter of 04/05/17   UA with Microscopic   Result Value Ref Range    Color Urine Light Yellow     Appearance Urine Clear     Glucose Urine Negative NEG mg/dL    Bilirubin Urine Negative NEG    Ketones Urine Negative NEG mg/dL    Specific Gravity Urine 1.012 1.003 - 1.035    Blood Urine Negative NEG    pH Urine 6.0 5.0 - 7.0 pH    Protein Albumin Urine Negative NEG mg/dL    Urobilinogen mg/dL Normal 0.0 - 2.0 mg/dL    Nitrite Urine Negative NEG    Leukocyte Esterase Urine Negative NEG    Source Midstream Urine     WBC Urine 1 0 - 2 /HPF    RBC Urine 1 0 -  2 /HPF    Bacteria Urine Few (A) NEG /HPF    Squamous Epithelial /HPF Urine <1 0 - 1 /HPF    Mucous Urine Present (A) NEG /LPF   Drug abuse screen 6 urine (tox)   Result Value Ref Range    Amphetamine Qual Urine  NEG     Negative   Cutoff for a negative amphetamine is 500 ng/mL or less.      Barbiturates Qual Urine  NEG     Negative   Cutoff for a negative barbiturate is 200 ng/mL or less.      Benzodiazepine Qual Urine  NEG     Negative   Cutoff for a negative benzodiazepine is 200 ng/mL or less.      Cannabinoids Qual Urine (A) NEG     Positive   Cutoff for a positive cannabinoid is greater than 50 ng/mL. This is an   unconfirmed screening result to be used for medical purposes only.      Cocaine Qual Urine  NEG     Negative   Cutoff for a negative cocaine is 300 ng/mL or less.      Ethanol Qual Urine  NEG     Negative   Cutoff for a negative urine ethanol is 0.05 g/dL or less      Opiates Qualitative Urine  NEG     Negative   Cutoff for a negative opiate is 300 ng/mL or less.              Consults:     Lurdes Walker MD Resident Signed OB/Gyn Progress Notes   Date of Service: 4/5/2017  2:39 PM Note Created: 4/5/2017  2:39 PM         []Hide copied text  []Hover for attribution information  Quick Note     Asked by team to evaluate patient for needed vaginal dilator and or hormone testing.      Review of her chart performed with findings as follows: Ca Ding is a 22 year old G0 who underwent gender affirmation surgery 3/23/2016 in [place]. She has since been seen for hormone therapy at [facility] and has a been previously seen by Dr. Marcelino and Carine at [facility] but doesn't appear to have followed up for some time. At her last visit in January she was having some issues with pain management related to her surgery.      Regarding her dilator, patient reports that she currently has a dilator but it is too small. She is unsure which size it is. Discussed with her that dilators available here in  the hospital are from [facility] and we would need to determine which size to obtain for her and thus need to measure her current dilator. She is unsure what she has but knows she needs one size larger.      Prior to visiting Ca, I discussed availability with RNs in the [facility] on the [place]. They discussed that they have dilators in stock but usually are able to order through [employer] with a couple day turn around. They have a variety in stock, including small (22mm diameter), medium (29mm), and large (35mm) in addition to other sizes. Should patient be able to measure her current dilator, we could consider getting a larger size from [facility]. Alternatively, should Ca have someone in the community who could provider her the appropriate size, there are shops in the [facility] that have available resources (e.g. [employer]) quickly.     Regarding her hormone levels, I reviewed with Ca that we do not routinely check levels but if she desired them that was reasonable. She is interested in estradiol and testosterone which can be added on to any blood test. She is currently taking estradiol 4mg daily and 100mg progesterone BID. We would recommend continuing on her current regimen without any abrupt changes. Patient reports she gets her care with Dr. Hillman at [facility]. Should she have any questions or concerns regarding her medications, we would recommend discussing with Dr. Hillman. I asked Ca and she is ok if we want to discuss her with Dr. Hillman.      Should additional questions arise, please let us know.      Discussed with Dr. Shekhar Walker MD, MS  Resident Physician-PGY4  Department of Obstetrics, Gynecology, and Women's Health                 [place] Hospital Course:   Ca Ding was admitted to [facility] with attending Dr. Longoria found as a voluntary patient. The patient was placed under status 15 (15 minute checks) to ensure patient safety.      Patient was admitted for suicidal ideation. She remained on the same medications including gabapentin, citalopram, prazosin and Latuda. Patient focused on developing and practicing coping skills during her hospitalization. She was very involved in groups learned about essential oils, water coloring reading and spirituality as her coping skills.      Ca Ding did participate in groups and was visible in the milieu. The patient's symptoms of suicidal ideation improved. Patient presents with a stable mood and organized thinking. After careful assessment she denies having any suicidal thinking. She is looking forward to continuing to develop skills in DBT which is scheduled for April 13th. He mother will be picking her up and getting her some items so that she can practice her coping skills at home. Patient is future oriented and has protective factors of supportive mother and friends. She is at moderate risk for relapse due to history.     Ca Ding was released to home. At the time of discharge Ca Ding was determined to not be a danger to herself or others.          Discharge Medications:     Discharge Medication List as of 4/10/2017 11:15 AM      START taking these medications    Details   hydrOXYzine (ATARAX) 25 MG tablet Take 1-2 tablets (25-50 mg) by mouth every 4 hours as needed for anxiety, Disp-120 tablet, R-1, E-Prescribe      prazosin (MINIPRESS) 1 MG capsule Take 1 capsule (1 mg) by mouth At Bedtime, Disp-30 capsule, R-1, E-Prescribe         CONTINUE these medications which have NOT CHANGED    Details   gabapentin (NEURONTIN) 600 MG tablet Take 2 tablets (1,200 mg) by mouth 3 times daily for 21 days, Disp-126 tablet, R-0, E-Prescribe      citalopram (CELEXA) 40 MG tablet Take 1 tablet (40 mg) by mouth daily for 21 days, Disp-21 tablet, R-0, E-Prescribe      lurasidone (LATUDA) 40 MG TABS tablet Take 1 tablet (40 mg) by mouth daily, Disp-21 tablet, R-0, E-Prescribe      progesterone  (PROMETRIUM) 100 MG capsule Take 1 capsule (100 mg) by mouth 2 times daily, Disp-28 capsule, R-0, E-Prescribe      estradiol (ESTRACE) 2 MG tablet Take 2 tablets (4 mg) by mouth daily, Disp-180 tablet, R-3, Fax         STOP taking these medications       nicotine (NICODERM CQ) 21 MG/24HR 24 hr patch Comments:   Reason for Stopping:                    Psychiatric Examination:   Appearance:  awake, alert and adequately groomed  Attitude:  cooperative  Eye Contact:  good  Mood:  good  Affect:  appropriate and in normal range  Speech:  clear, coherent  Psychomotor Behavior:  no evidence of tardive dyskinesia, dystonia, or tics  Thought Process:  logical, linear and goal oriented  Associations:  no loose associations  Thought Content:  no evidence of suicidal ideation or homicidal ideation  Insight:  fair  Judgment:  fair  Oriented to:  time, person, and place  Attention Span and Concentration:  intact  Recent and Remote Memory:  intact  Language: Able to name objects, Able to repeat phrases and Able to read and write  Fund of Knowledge: adequate  Muscle Strength and Tone: normal  Gait and Station: Normal         Discharge Plan:   Health Care Follow-up Appointments:   Medication Management  Date: April ,14th, 2017.  Time: 11:00am.  Provider: Dr. Conrad at [facility].  Address: [address] [facility] [address] [address], [place] [address].  Phone: [phone]  [facility] faxed the Discharge Summary and AVS to this provider at Fax: [phone].     Therapy Appointment   Date: Thursday , April 20th, 2017.  Time: 4:00pm   Provider: Kristal Cantu at [facility].  Address: [address] [place]. [facility].  Phone: [phone].  [facility] has faxed the Discharge Summary and AVS to this provider at Fax: [phone].     DBT Therapy Appointments:  Date: 04/13/17 VILLA Suazo  Time: 02:30 PM  Mental Health Services (DBT):  [facility] [address]  [place] [address]  Phone: [phone]  Fax: [phone]  Medical Records Fax: [phone]  [facility] faxed the H&P, D/C Meds, Facesheet, discharge summary and AVS to this clinic at Fax: [phone] Attn: Intake  Attend all scheduled appointments with your outpatient providers. Call at least 24 hours in advance if you need to reschedule an appointment to ensure continued access to your outpatient providers.   Major Treatments, Procedures and Findings: You were provided with: a psychiatric assessment, assessed for medical stability, medication evaluation and/or management, group therapy, art therapy, milieu management, medical interventions and skills/OT groups.     Symptoms to Report: If you experience any of the following symptoms please report them right away to your provider or to family/friends; feeling more aggressive, increased confusion, losing more sleep, mood getting worse or thoughts of suicide.     Early warning signs can include: Early warning signs that could signal a potential relapse could include but not limited to the following; increased depression or anxiety sleep disturbances increased thoughts or behaviors of suicide or self-harm increased unusual thinking, such as paranoia or hearing voices.     Safety and Wellness: Take all medicines as directed. Make no changes unless your doctor suggests them. Follow treatment recommendations. Refrain from alcohol and non-prescribed drugs. Ask your support system to help you reduce your access to items that could harm yourself or others. If there is a concern for safety, call 911..     Resources:   Crisis Intervention: 408.378.8857 or 260-272-1163 (TTY: 545.730.3451). Call anytime for help.  National [place] on Mental Illness (www.mn.briseida.org): 794.424.6992 or 681-957-6180.  [place] Association for Children's Mental Health (www.macmh.org): 647.286.3692.  Alcoholics Anonymous (www.alcoholics-anonymous.org): Check your phone book for your local chapter.  Suicide Awareness Voices of Education (SAVE)  "(www.save.org): 888-511-SAVE (7283)  National Suicide Prevention Line (www.mentalhealthmn.org): 039-224-ZCXH (5948)  Mental Health Consumer/Survivor Network of [place] (www.mhcsn.net): 863.546.8082 or 472-051-6332  Mental Health Association of [place] (www.mentalhealth.org): 985.652.3605 or 061-001-5384  Self- Management and Recovery Training., SMART-- Toll free: 901.783.3383 www.GoWorkaBit  [facility] Crisis (COPE) Response - Adult 099 927-8939  Text 4 Life: txt \"LIFE\" to 28498 for immediate support and crisis intervention  Crisis text line: Text \"START\" to 706-440. Free, confidential, 24/7.  Crisis Intervention: 363.924.8831 or 693-508-2593. Call anytime for help.      The treatment team has appreciated the opportunity to work with you. Ca, please take care and make your recovery a daily recovery. If you have any questions or concerns our unit number is [phone]. You will be receiving a follow-up phone call within the next three days from a representative from behavioral health. You have identified the best phone number to reach you as [phone] (home) None (work).   Attestation:  The patient has been seen and evaluated by me,  Debra Naegele APRN, CNS on 4/10/2017  Discharge time > 30 minutes  "

## 2017-04-17 ENCOUNTER — CARE COORDINATION (OUTPATIENT)
Dept: FAMILY MEDICINE | Facility: CLINIC | Age: 22
End: 2017-04-17

## 2017-04-21 NOTE — PROGRESS NOTES
Called pt to check on recommended follow-up appointments after recent hospitalization and to schedule with PCP.  Message left.    Jessenia Elder  /Care Coordinator

## 2017-05-10 ENCOUNTER — HOSPITAL ENCOUNTER (INPATIENT)
Facility: CLINIC | Age: 22
LOS: 2 days | Discharge: HOME | DRG: 882 | End: 2017-05-12
Attending: FAMILY MEDICINE | Admitting: PSYCHIATRY & NEUROLOGY

## 2017-05-10 VITALS
OXYGEN SATURATION: 98 % | HEART RATE: 82 BPM | DIASTOLIC BLOOD PRESSURE: 104 MMHG | TEMPERATURE: 96.5 F | RESPIRATION RATE: 16 BRPM | SYSTOLIC BLOOD PRESSURE: 143 MMHG

## 2017-05-10 DIAGNOSIS — F60.3 BORDERLINE PERSONALITY DISORDER (H): ICD-10-CM

## 2017-05-10 DIAGNOSIS — R45.851 SUICIDAL IDEATION: ICD-10-CM

## 2017-05-10 DIAGNOSIS — F43.10 POSTTRAUMATIC STRESS DISORDER WITH DISSOCIATIVE SYMPTOMS: ICD-10-CM

## 2017-05-10 LAB
AMPHETAMINES UR QL SCN: ABNORMAL
BARBITURATES UR QL: ABNORMAL
BENZODIAZ UR QL: ABNORMAL
CANNABINOIDS UR QL SCN: ABNORMAL
COCAINE UR QL: ABNORMAL
ETHANOL UR QL SCN: ABNORMAL
GLUCOSE BLDC GLUCOMTR-MCNC: 78 MG/DL (ref 70–99)
HCG UR QL: NEGATIVE
OPIATES UR QL SCN: ABNORMAL

## 2017-05-10 PROCEDURE — 81025 URINE PREGNANCY TEST: CPT | Performed by: FAMILY MEDICINE

## 2017-05-10 PROCEDURE — 99284 EMERGENCY DEPT VISIT MOD MDM: CPT | Mod: Z6 | Performed by: FAMILY MEDICINE

## 2017-05-10 PROCEDURE — 99285 EMERGENCY DEPT VISIT HI MDM: CPT | Mod: 25

## 2017-05-10 PROCEDURE — 80307 DRUG TEST PRSMV CHEM ANLYZR: CPT | Performed by: FAMILY MEDICINE

## 2017-05-10 PROCEDURE — 90791 PSYCH DIAGNOSTIC EVALUATION: CPT

## 2017-05-10 PROCEDURE — 80320 DRUG SCREEN QUANTALCOHOLS: CPT | Performed by: FAMILY MEDICINE

## 2017-05-10 PROCEDURE — 00000146 ZZHCL STATISTIC GLUCOSE BY METER IP

## 2017-05-10 PROCEDURE — 12400001 ZZH R&B MH UMMC

## 2017-05-10 RX ORDER — PRAZOSIN HYDROCHLORIDE 1 MG/1
1 CAPSULE ORAL AT BEDTIME
Status: DISCONTINUED | OUTPATIENT
Start: 2017-05-10 | End: 2017-05-12 | Stop reason: HOSPADM

## 2017-05-10 RX ORDER — HYDROXYZINE HYDROCHLORIDE 25 MG/1
25-50 TABLET, FILM COATED ORAL EVERY 4 HOURS PRN
Status: DISCONTINUED | OUTPATIENT
Start: 2017-05-10 | End: 2017-05-12 | Stop reason: HOSPADM

## 2017-05-10 RX ORDER — LURASIDONE HYDROCHLORIDE 40 MG/1
40 TABLET, FILM COATED ORAL DAILY
Status: DISCONTINUED | OUTPATIENT
Start: 2017-05-11 | End: 2017-05-12 | Stop reason: HOSPADM

## 2017-05-10 RX ORDER — GABAPENTIN 600 MG/1
1200 TABLET ORAL 3 TIMES DAILY
Status: DISCONTINUED | OUTPATIENT
Start: 2017-05-11 | End: 2017-05-12 | Stop reason: HOSPADM

## 2017-05-10 RX ORDER — OLANZAPINE 10 MG/2ML
10 INJECTION, POWDER, FOR SOLUTION INTRAMUSCULAR
Status: DISCONTINUED | OUTPATIENT
Start: 2017-05-10 | End: 2017-05-12 | Stop reason: HOSPADM

## 2017-05-10 RX ORDER — ALUMINA, MAGNESIA, AND SIMETHICONE 2400; 2400; 240 MG/30ML; MG/30ML; MG/30ML
30 SUSPENSION ORAL EVERY 4 HOURS PRN
Status: DISCONTINUED | OUTPATIENT
Start: 2017-05-10 | End: 2017-05-12 | Stop reason: HOSPADM

## 2017-05-10 RX ORDER — BISACODYL 10 MG
10 SUPPOSITORY, RECTAL RECTAL DAILY PRN
Status: DISCONTINUED | OUTPATIENT
Start: 2017-05-10 | End: 2017-05-12 | Stop reason: HOSPADM

## 2017-05-10 RX ORDER — ESTRADIOL 2 MG/1
4 TABLET ORAL DAILY
Status: DISCONTINUED | OUTPATIENT
Start: 2017-05-11 | End: 2017-05-12 | Stop reason: HOSPADM

## 2017-05-10 RX ORDER — OLANZAPINE 10 MG/1
10 TABLET ORAL
Status: DISCONTINUED | OUTPATIENT
Start: 2017-05-10 | End: 2017-05-12 | Stop reason: HOSPADM

## 2017-05-10 RX ORDER — CITALOPRAM HYDROBROMIDE 40 MG/1
40 TABLET ORAL DAILY
Status: DISCONTINUED | OUTPATIENT
Start: 2017-05-11 | End: 2017-05-12 | Stop reason: HOSPADM

## 2017-05-10 RX ORDER — ACETAMINOPHEN 325 MG/1
650 TABLET ORAL EVERY 4 HOURS PRN
Status: DISCONTINUED | OUTPATIENT
Start: 2017-05-10 | End: 2017-05-12 | Stop reason: HOSPADM

## 2017-05-10 ASSESSMENT — ENCOUNTER SYMPTOMS
DYSPHORIC MOOD: 1
NERVOUS/ANXIOUS: 1
ABDOMINAL PAIN: 0
FEVER: 0
SHORTNESS OF BREATH: 0

## 2017-05-10 ASSESSMENT — ACTIVITIES OF DAILY LIVING (ADL)
ORAL_HYGIENE: INDEPENDENT
GROOMING: INDEPENDENT
DRESS: INDEPENDENT
LAUNDRY: WITH SUPERVISION

## 2017-05-10 NOTE — IP AVS SNAPSHOT
[address] [address]    Phone:  [phone]                                       After Visit Summary   5/10/2017    Ca Ding    MRN: [id number]           After Visit Summary Signature Page     I have received my discharge instructions, and my questions have been answered. I have discussed any challenges I see with this plan with the nurse or doctor.    ..........................................................................................................................................  Patient/Patient Representative Signature      ..........................................................................................................................................  Patient Representative Print Name and Relationship to Patient    ..................................................               ................................................  Date                                            Time    ..........................................................................................................................................  Reviewed by Signature/Title    ...................................................              ..............................................  Date                                                            Time

## 2017-05-10 NOTE — IP AVS SNAPSHOT
MRN:[id number]                      After Visit Summary   5/10/2017    Ca Ding    MRN: [id number]           Thank you!     Thank you for choosing [facility] for your care. Our goal is always to provide you with excellent care.        Patient Information     Date Of Birth          1995        About your hospital stay     You were admitted on:  May 10, 2017 You last received care in the:   [facility]    You were discharged on:  May 12, 2017       Who to Call     For medical emergencies, please call 911.  For non-urgent questions about your medical care, please call your primary care provider or clinic, [phone]          Attending Provider     Provider Specialty    Juan Pablo Reich MD Emergency Medicine    AndAlex reis MD Psychiatry    [facility], Ko Schrader MD Psychiatry       Primary Care Provider Office Phone # Fax #    Mahad Hillman [phone]       [facility] [address] [facility] [address]        Further instructions from your care team       Behavioral Discharge Planning and Instructions    Summary: You were admitted with suicidal thoughts. During your hospitalization, you met daily with the staff and were encouraged to attend all therapeutic programming. You met with the Clinical Treatment Coordinator and participated in your discharge planning. You are now stabilized and are discharged home.  Referrals and recommendations are listed below.   Main Diagnosis:  Major Depressive Disorder; Recurrent, Severe; PTSD; BPD; Hx of DID     Major Treatments, Procedures and Findings: Please take all of your medications as prescribed and continue with your psychiatrist and DBT programming.    Symptoms to Report: losing more sleep, mood getting worse or thoughts of suicide    Lifestyle Adjustment: Adjust your lifestyle to get enough sleep, relaxation, exercise and  good nutrition. Continue to develop healthy coping skills to decrease  stress and promote a healthy living environment. No use of alcohol, illegal drugs or addictive medications other than what is currently prescribed. Attend all your appointments and take your medications as prescribed.    Psychiatry Follow-up:   Dr. Conrad (medication Management)   Therapist Kristal [facility] in [facility]  [phone]  [phone]    Patient will find new psychiatrist pending insurance changes.    DBT Therapy with [address]  [place]  [address]  [phone]    Resources:   Crisis Intervention: 617.181.2497 or 774-601-0640 (TTY: [phone]).  Call anytime for help.  National [place] on Mental Illness (www.[place].briseida.org): 404.659.5986 or 542-044-2803.    General Medication Instructions:   See your medication sheet(s) for instructions.   Take all medicines as directed.  Make no changes unless your doctor suggests them.   Go to all your doctor visits.  Be sure to have all your required lab tests. This way, your medicines can be refilled on time.  Do not use any drugs not prescribed by your doctor.  Avoid alcohol.    The treatment team has appreciated the opportunity to work with you.  We wish you the best in the future.  If you have any questions or concerns our unit number is [phone].      Pending Results     No orders found from 5/8/2017 to 5/11/2017.            Statement of Approval     Ordered          05/12/17 1147  I have reviewed and agree with all the recommendations and orders detailed in this document.  EFFECTIVE NOW     Approved and electronically signed by:  Ko Noble MD             Admission Information     Date & Time Department Dept. Phone    5/10/2017 [address] [phone]      Your Vitals Were     Blood Pressure Pulse Temperature Respirations Pulse Oximetry       143/104 82 96.5  F (35.8  C) (Oral) 16 98%       MyChart Information     [facility] gives you secure access to  your electronic health record. If you see a primary care provider, you can also send messages to your care team and make appointments. If you have questions, please call your primary care clinic.  If you do not have a primary care provider, please call [phone] and they will assist you.        Care EveryWhere ID     This is your Care EveryWhere ID. This could be used by other organizations to access your [facility] medical records  [id number]           Review of your medicines      CONTINUE these medicines which have NOT CHANGED        Dose / Directions    citalopram 40 MG tablet   Commonly known as:  celeXA   Used for:  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)        Dose:  40 mg   Take 1 tablet (40 mg) by mouth daily for 21 days   Quantity:  21 tablet   Refills:  0       estradiol 2 MG tablet   Commonly known as:  ESTRACE   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        Dose:  4 mg   Take 2 tablets (4 mg) by mouth daily   Quantity:  180 tablet   Refills:  3       gabapentin 600 MG tablet   Commonly known as:  NEURONTIN   Used for:  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)        Dose:  1200 mg   Take 2 tablets (1,200 mg) by mouth 3 times daily for 21 days   Quantity:  126 tablet   Refills:  0       hydrOXYzine 25 MG tablet   Commonly known as:  ATARAX   Used for:  Anxiety        Dose:  25-50 mg   Take 1-2 tablets (25-50 mg) by mouth every 4 hours as needed for anxiety   Quantity:  120 tablet   Refills:  1       lurasidone 40 MG Tabs tablet   Commonly known as:  LATUDA   Used for:  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)        Dose:  40 mg   Take 1 tablet (40 mg) by mouth daily   Quantity:  21 tablet   Refills:  0       prazosin 1 MG capsule   Commonly known as:  MINIPRESS   Used for:  Nightmare        Dose:  1 mg   Take 1 capsule (1 mg) by  mouth At Bedtime   Quantity:  30 capsule   Refills:  1       progesterone 100 MG capsule   Commonly known as:  PROMETRIUM   Used for:  Hot flashes        Dose:  100 mg   Take 1 capsule (100 mg) by mouth 2 times daily   Quantity:  28 capsule   Refills:  0                Protect others around you: Learn how to safely use, store and throw away your medicines at www.disposemymeds.org.             Medication List: This is a list of all your medications and when to take them. Check marks below indicate your daily home schedule. Keep this list as a reference.      Medications           Morning Afternoon Evening Bedtime As Needed    citalopram 40 MG tablet   Commonly known as:  celeXA   Take 1 tablet (40 mg) by mouth daily for 21 days   Last time this was given:  40 mg on 5/11/2017 10:22 PM                                estradiol 2 MG tablet   Commonly known as:  ESTRACE   Take 2 tablets (4 mg) by mouth daily   Last time this was given:  4 mg on 5/11/2017 10:23 PM                                gabapentin 600 MG tablet   Commonly known as:  NEURONTIN   Take 2 tablets (1,200 mg) by mouth 3 times daily for 21 days   Last time this was given:  1,200 mg on 5/12/2017  8:57 AM                                hydrOXYzine 25 MG tablet   Commonly known as:  ATARAX   Take 1-2 tablets (25-50 mg) by mouth every 4 hours as needed for anxiety                                lurasidone 40 MG Tabs tablet   Commonly known as:  LATUDA   Take 1 tablet (40 mg) by mouth daily   Last time this was given:  40 mg on 5/11/2017  5:53 PM                                prazosin 1 MG capsule   Commonly known as:  MINIPRESS   Take 1 capsule (1 mg) by mouth At Bedtime   Last time this was given:  1 mg on 5/11/2017 10:22 PM                                progesterone 100 MG capsule   Commonly known as:  PROMETRIUM   Take 1 capsule (100 mg) by mouth 2 times daily   Last time this was given:  100 mg on 5/12/2017  8:57 AM

## 2017-05-11 LAB
ALBUMIN SERPL-MCNC: 3.4 G/DL (ref 3.4–5)
ALP SERPL-CCNC: 58 U/L (ref 40–150)
ALT SERPL W P-5'-P-CCNC: 16 U/L (ref 0–50)
ANION GAP SERPL CALCULATED.3IONS-SCNC: 6 MMOL/L (ref 3–14)
AST SERPL W P-5'-P-CCNC: 11 U/L (ref 0–45)
BASOPHILS # BLD AUTO: 0.1 10E9/L (ref 0–0.2)
BASOPHILS NFR BLD AUTO: 1.1 %
BILIRUB SERPL-MCNC: 0.5 MG/DL (ref 0.2–1.3)
BUN SERPL-MCNC: 9 MG/DL (ref 7–30)
CALCIUM SERPL-MCNC: 8.6 MG/DL (ref 8.5–10.1)
CHLORIDE SERPL-SCNC: 109 MMOL/L (ref 94–109)
CHOLEST SERPL-MCNC: 178 MG/DL
CO2 SERPL-SCNC: 27 MMOL/L (ref 20–32)
CREAT SERPL-MCNC: 0.71 MG/DL (ref 0.52–1.04)
DIFFERENTIAL METHOD BLD: NORMAL
EOSINOPHIL # BLD AUTO: 0.2 10E9/L (ref 0–0.7)
EOSINOPHIL NFR BLD AUTO: 2.7 %
ERYTHROCYTE [DISTWIDTH] IN BLOOD BY AUTOMATED COUNT: 13.3 % (ref 10–15)
GFR SERPL CREATININE-BSD FRML MDRD: NORMAL ML/MIN/1.7M2
GLUCOSE SERPL-MCNC: 89 MG/DL (ref 70–99)
HCT VFR BLD AUTO: 36.6 % (ref 35–47)
HDLC SERPL-MCNC: 38 MG/DL
HGB BLD-MCNC: 12 G/DL (ref 11.7–15.7)
IMM GRANULOCYTES # BLD: 0 10E9/L (ref 0–0.4)
IMM GRANULOCYTES NFR BLD: 0.2 %
LDLC SERPL CALC-MCNC: 99 MG/DL
LYMPHOCYTES # BLD AUTO: 2.2 10E9/L (ref 0.8–5.3)
LYMPHOCYTES NFR BLD AUTO: 34.3 %
MCH RBC QN AUTO: 27.5 PG (ref 26.5–33)
MCHC RBC AUTO-ENTMCNC: 32.8 G/DL (ref 31.5–36.5)
MCV RBC AUTO: 84 FL (ref 78–100)
MONOCYTES # BLD AUTO: 0.5 10E9/L (ref 0–1.3)
MONOCYTES NFR BLD AUTO: 7.7 %
NEUTROPHILS # BLD AUTO: 3.4 10E9/L (ref 1.6–8.3)
NEUTROPHILS NFR BLD AUTO: 54 %
NONHDLC SERPL-MCNC: 140 MG/DL
NRBC # BLD AUTO: 0 10*3/UL
NRBC BLD AUTO-RTO: 0 /100
PLATELET # BLD AUTO: 268 10E9/L (ref 150–450)
POTASSIUM SERPL-SCNC: 3.9 MMOL/L (ref 3.4–5.3)
PROT SERPL-MCNC: 6.9 G/DL (ref 6.8–8.8)
RBC # BLD AUTO: 4.37 10E12/L (ref 3.8–5.2)
SODIUM SERPL-SCNC: 142 MMOL/L (ref 133–144)
TRIGL SERPL-MCNC: 203 MG/DL
TSH SERPL DL<=0.005 MIU/L-ACNC: 0.93 MU/L (ref 0.4–4)
WBC # BLD AUTO: 6.4 10E9/L (ref 4–11)

## 2017-05-11 PROCEDURE — 84443 ASSAY THYROID STIM HORMONE: CPT | Performed by: NURSE PRACTITIONER

## 2017-05-11 PROCEDURE — 99222 1ST HOSP IP/OBS MODERATE 55: CPT | Mod: AI | Performed by: PSYCHIATRY & NEUROLOGY

## 2017-05-11 PROCEDURE — 25000132 ZZH RX MED GY IP 250 OP 250 PS 637: Performed by: NURSE PRACTITIONER

## 2017-05-11 PROCEDURE — 80053 COMPREHEN METABOLIC PANEL: CPT | Performed by: NURSE PRACTITIONER

## 2017-05-11 PROCEDURE — 36415 COLL VENOUS BLD VENIPUNCTURE: CPT | Performed by: NURSE PRACTITIONER

## 2017-05-11 PROCEDURE — 85025 COMPLETE CBC W/AUTO DIFF WBC: CPT | Performed by: NURSE PRACTITIONER

## 2017-05-11 PROCEDURE — 80061 LIPID PANEL: CPT | Performed by: NURSE PRACTITIONER

## 2017-05-11 PROCEDURE — 12400001 ZZH R&B MH UMMC

## 2017-05-11 RX ADMIN — OLANZAPINE 10 MG: 10 TABLET, FILM COATED ORAL at 10:30

## 2017-05-11 RX ADMIN — PROGESTERONE 100 MG: 100 CAPSULE ORAL at 22:22

## 2017-05-11 RX ADMIN — PRAZOSIN HYDROCHLORIDE 1 MG: 1 CAPSULE ORAL at 22:22

## 2017-05-11 RX ADMIN — LURASIDONE HYDROCHLORIDE 40 MG: 40 TABLET, FILM COATED ORAL at 17:53

## 2017-05-11 RX ADMIN — ESTRADIOL 4 MG: 2 TABLET ORAL at 22:23

## 2017-05-11 RX ADMIN — GABAPENTIN 1200 MG: 600 TABLET, FILM COATED ORAL at 22:22

## 2017-05-11 RX ADMIN — PROGESTERONE 100 MG: 100 CAPSULE ORAL at 09:32

## 2017-05-11 RX ADMIN — CITALOPRAM HYDROBROMIDE 40 MG: 40 TABLET ORAL at 22:22

## 2017-05-11 ASSESSMENT — ACTIVITIES OF DAILY LIVING (ADL)
RETIRED_COMMUNICATION: 0-->UNDERSTANDS/COMMUNICATES WITHOUT DIFFICULTY
DRESS: INDEPENDENT
RETIRED_EATING: 0-->INDEPENDENT
LAUNDRY: WITH SUPERVISION
GROOMING: INDEPENDENT
DRESS: SCRUBS (BEHAVIORAL HEALTH)
DRESS: 0-->INDEPENDENT
ORAL_HYGIENE: INDEPENDENT
SWALLOWING: 0-->SWALLOWS FOODS/LIQUIDS WITHOUT DIFFICULTY
BATHING: 0-->INDEPENDENT
AMBULATION: 0-->INDEPENDENT
TOILETING: 0-->INDEPENDENT
TRANSFERRING: 0-->INDEPENDENT
COGNITION: 0 - NO COGNITION ISSUES REPORTED
ORAL_HYGIENE: INDEPENDENT
GROOMING: INDEPENDENT

## 2017-05-11 NOTE — PROGRESS NOTES
Pt. was in room upset. Pt. wanted to discharge today, but is on a hold. Pt. was pounding on the walls. Staff checked in and Pt. threw her glasses. Staff explained that Pt. needed to demonstrate being safe. Pt. Was observed screaming loudly in her room.

## 2017-05-11 NOTE — PROGRESS NOTES
Patient has not attended OT Groups. Will be given a self assessment form. OT staff will explain there value of OT, including them in their tx plan and offer options for meeting their needs and identifying goals.

## 2017-05-11 NOTE — PLAN OF CARE
Problem: General Plan of Care (Inpatient Behavioral)  Goal: Team Discussion  Team Plan:   BEHAVIORAL TEAM DISCUSSION     Continued Stay Criteria/Rationale: New admit     Plan: Patient has been extremely labile and has been given medication to calm her.  Psychiatrist spoke with the patient's mother who plans     Participants: Dr. Mazin Noble; Carla Headley LICSW: Jacky Flowers OTR/L; Concepcion Acuna RN     Summary/Recommendation: See Plan     Medical/Physical: See Consult     Progress: Labile and currently resting
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
## 2017-05-11 NOTE — PLAN OF CARE
Problem: Depressive Symptoms  Goal: Depressive Symptoms  Signs and symptoms of listed problems will be absent or manageable.   23 yo male to female trans-gender admitted on a 72 hour hold. She is crying loudly and un-able to cooperate with the admit interview. She is intermittently scream crying and un-able to follow re-direction at this time. We will attempt to interview pt later when she is more comfortable.

## 2017-05-11 NOTE — H&P
"DATE OF ADMISSION:  05/10/2017.       DATE OF SERVICE:  05/11/2017.       CHIEF COMPLAINT:  \"I wrote a suicide note as a form of catharsis.\"        HISTORY OF PRESENT ILLNESS:  Ca Ding is a 22-year-old male to female transgender patient with a history of depression, PTSD and borderline personality disorder who was admitted after being brought in by police and placed on a 72-hour hold.  The patient wrote a suicide note but said she did not have a plan.  Did tell her siblings about it and they called police.  Says that she was told by the police that if she came in voluntarily that she would just be assessed and discharged.  She says she feels lied to.  She says she is just upset because she is here, is very tearful and wailing at times.  Denies any depression, denies any suicidal thoughts now, but does endorse having them yesterday.  Denies homicidal ideation, denies auditory or visual hallucinations.  Does say that her fiance was on the phone with her and had just broke up with her.  She said that she is in a polyamorous relationship and when she was manic, she did things that ruined her relationship.  Says that if she were to discharge today she would stay with her parents.  The patient is agreeable to let this provider speak to her parents.  Says she has been going to DBT 3 times a week and it has been helpful.      I did speak to the patient's mother, Ainka who said that the patient had been doing well until yesterday.  She says she had a down day, asked her significant other to get her some marijuana and when her significant other refused then she got quite upset and wrote the suicide note.  Does feel that this may have been a tantrum, but family took it seriously.  Does feel that the patient has been doing better in DBT.  She has been brighter and improved overall.  Is agreeable that the patient should stay until stabilized.      PAST PSYCHIATRIC HISTORY:  The patient does have a history of 2 recent " hospitalizations this spring.  Has a psychiatrist and a DBT therapist.  Does have a history of 2 suicide attempts in the past as well as a history of self-injurious behavior.      CURRENT PSYCHIATRIC MEDICATIONS:   1.  Celexa 40 mg q. day.   2.  Latuda 40 mg q. day.   3.  Prazosin 1 mg each day at bedtime.   4.  Gabapentin 1200 mg t.i.d.        The patient said that no recent medication changes and has been taking them regularly.      ALLERGIES:  No known drug allergies.      PAST MEDICAL HISTORY:  The patient has a history of cold-induced asthma and a history of loss of consciousness after a head injury.      SUBSTANCE HISTORY:  The patient has abused marijuana and has used hallucinogens in the past.  Is a smoker, never been through CD treatment.      FAMILY PSYCHIATRIC HISTORY:  The patient's mother has a history of OCD and PTSD.  The patient's father has a history of chemical dependency and Asperger syndrome.  Maternal grandmother had depression and chemical dependency issues, a brother with depression and PTSD and chemical dependency issues.      SOCIAL HISTORY:  The patient moved from [place] 5 years ago.  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  Is currently on a leave of absence.  Is in a polyamorous relationship with her fiance.  Has supportive family.      PHYSICAL EXAMINATION:  Please see the physical exam from Dr. Reich in the ER, which I have reviewed.   VITAL SIGNS:  Blood pressure 143/104, pulse 82, respirations 16, temperature 96.5.      MENTAL STATUS EXAMINATION:  SOHEILA:  The patient is a 22-year-old male to female, not very cooperative, agitated and tearful, poor eye contact.  Speech:  Regular rate, rhythm, volume and tone.  Mood is not depressed.  Affect is elevated, tearful.  Thought process is preoccupied with leaving.  Thought content:  Denies current suicidal ideation but does admit to having it yesterday.  Denies homicidal ideation, denies any psychosis.  No loosening of associations noted.   Sensorium is clear.  Cognition:  Oriented x3.  Recent and remote memory fair.  Attention and concentration fair.  Language:  No deficits noted.  Fund of knowledge appropriate.  Muscle strength and tone, no deficits noted.  Gait and station, no deficits noted.  Insight and judgment are both limited at this time.      DIAGNOSES:   Axis I:     1.  Posttraumatic stress disorder.   2.  Cannabis use disorder.   3.  Nicotine use disorder.   Axis II:  Borderline personality disorder.   Axis III:  See past medical history.      PLAN:   1.  The patient was admitted to station 10 under a 72-hour hold.  Encouraged to engage in groups and with staff on unit.   2.  Will continue the patient's current medication regimen.   3.  Will continue the hold for now and reevaluate whether to continue it versus having the patient sign in voluntarily versus a petition for commitment tomorrow.         EVERARDO IQBAL MD             D: 2017 11:35   T: 2017 12:23   MT: MELIA      Name:     COLTON THOMPSON   MRN:      [id number]        Account:      [id number]   :      1995           Admitted:     [id number]      Document: [id number]

## 2017-05-11 NOTE — PROGRESS NOTES
05/10/17 2307   Patient Belongings   Did you bring any home meds/supplements to the hospital?  No   Patient Belongings cell phone/electronics;shoes;keys;clothing;purse;bracelet   Disposition of Belongings see note   Belongings Search Yes   Clothing Search Yes   Second Staff Theresa GARNER     With pt: bracelets    In pt locker: purse, shirt, shorts, shoes, cell phone, white phone , keys, lanyard, passport, rocks/rock sac, matches x3    Nothing to security    ADMISSION:  I am responsible for any personal items that are not sent to the safe or pharmacy. [facility] is not responsible for loss, theft or damage of any property in my possession.    Patient Signature _____________________ Date/Time _____________________    Staff Signature _______________________ Date/Time _____________________    2nd Staff person, if patient is unable/unwilling to sign  ___________________________________ Date/Time _____________________    DISCHARGE:  My personal items have been returned to me.   Patient Signature _____________________ Date/Time _____________________

## 2017-05-11 NOTE — PROGRESS NOTES
05/11/17 1216   Patient Belongings   Did you bring any home meds/supplements to the hospital?  No   Patient Belongings clothing;other (see comments)   Disposition of Belongings Locker     Locker: Sweater, coconut oil, orange rojelio, magazines    ADMISSION:  I am responsible for any personal items that are not sent to the safe or pharmacy. [facility] is not responsible for loss, theft or damage of any property in my possession.    Patient Signature _____________________ Date/Time _____________________    Staff Signature _______________________ Date/Time _____________________    [facility] Staff person, if patient is unable/unwilling to sign  ___________________________________ Date/Time _____________________    DISCHARGE:  My personal items have been returned to me.   Patient Signature _____________________ Date/Time _____________________

## 2017-05-11 NOTE — PHARMACY-ADMISSION MEDICATION HISTORY
Admission Medication History status for the 5/10/2017 admission is complete.  See EPIC admission navigator for Prior to Admission medications.    Medication history sources:  Patient, attempted to call her [facility] pharmacy ([address]) to confirm, but was closed for evening     Medication history source reliability: Moderate; patient seemed very drowsy during interview and could only confirm the medication list I had for her. It was consistent with medical records from other healthcare systems. I intended to confirm her medications and doses with her pharmacy, but it was closed for the evening.     Medication adherence:  Good per patient. She reports she never misses/forgets to take her medications, although she did state she doesn't take her prazosin regularly since she doesn't think it is helpful.    Changes made to PTA medication list (reason)  Added: None  Deleted: None  Changed: None    Additional medication history information (including reliability of information, actions taken by pharmacist):  - The patient was very drowsy during interview, but confirmed that she was taking the medications and doses in her [id number] medication list. I was going to confirm her list and compliance with her pharmacy ([facility], [address]) but it was closed.     Time spent in this activity: 15 minutes    Medication history completed by: Patricia Newberry, Brenda    Prior to Admission medications    Medication Sig Last Dose Taking? Auth Provider   hydrOXYzine (ATARAX) 25 MG tablet Take 1-2 tablets (25-50 mg) by mouth every 4 hours as needed for anxiety 5/9/2017 Yes Naegele, Debra Ann, APRN CNP   prazosin (MINIPRESS) 1 MG capsule Take 1 capsule (1 mg) by mouth At Bedtime Past Week Yes Naegele, Debra Ann, APRN CNP   gabapentin (NEURONTIN) 600 MG tablet Take 2 tablets (1,200 mg) by mouth 3 times daily for 21 days 5/10/2017 Yes Jewels Hartmann MD   citalopram (CELEXA) 40 MG tablet Take 1 tablet (40 mg) by mouth daily  for 21 days 5/9/2017 Yes Jewels Hartmann MD   lurasidone (LATUDA) 40 MG TABS tablet Take 1 tablet (40 mg) by mouth daily 5/9/2017 Yes Jewels Hartmann MD   progesterone (PROMETRIUM) 100 MG capsule Take 1 capsule (100 mg) by mouth 2 times daily 5/10/2017 at 1 dose Yes Jewels Hartmann MD   estradiol (ESTRACE) 2 MG tablet Take 2 tablets (4 mg) by mouth daily 5/9/2017 Yes Surekha Khan MD
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
## 2017-05-11 NOTE — PROGRESS NOTES
05/11/17 1418   Behavioral Health   Hallucinations denies / not responding to hallucinations   Thinking poor concentration   Orientation time: oriented;place: oriented;person: oriented;date: oriented   Memory baseline memory   Insight poor   Judgement impaired   Eye Contact at floor   Affect blunted, flat;tense;irritable   Mood anxious;irritable   Physical Appearance/Attire attire appropriate to age and situation   Hygiene neglected grooming - unclean body, hair, teeth   Suicidality other (see comments)  (none stated)   Self Injury other (see comment)  (none stated)   Activity isolative   Speech clear;coherent   Medication Sensitivity no observed side effects;no stated side effects   Psychomotor / Gait balanced   Psycho Education   Type of Intervention 1:1 intervention   Response refuses   Hours 0.5   Treatment Detail check in    Activities of Daily Living   Hygiene/Grooming independent   Oral Hygiene independent   Dress independent   Laundry with supervision   Room Organization independent   Activity   Activity Level of Assistance independent   Behavioral Health Interventions   Depression maintain safety precautions;maintain safe secure environment   Social and Therapeutic Interventions (Depression) encourage socialization with peers;encourage effective boundaries with peers;encourage participation in therapeutic groups and milieu activities     Pt was isolative in her room. Pt refused to eat and check in with staff. Pt had rough morning, screaming, yelling and crying for the most part. Pt did not ate breakfast or lunch. Pt calm down after taking medication and pt is sleeping ever since. Staff did not observe any SI or SIB this morning.

## 2017-05-12 PROCEDURE — 99239 HOSP IP/OBS DSCHRG MGMT >30: CPT | Performed by: PSYCHIATRY & NEUROLOGY

## 2017-05-12 PROCEDURE — 90853 GROUP PSYCHOTHERAPY: CPT

## 2017-05-12 PROCEDURE — 25000132 ZZH RX MED GY IP 250 OP 250 PS 637: Performed by: NURSE PRACTITIONER

## 2017-05-12 RX ADMIN — PROGESTERONE 100 MG: 100 CAPSULE ORAL at 08:57

## 2017-05-12 RX ADMIN — GABAPENTIN 1200 MG: 600 TABLET, FILM COATED ORAL at 08:57

## 2017-05-12 NOTE — PROGRESS NOTES
Attended 1.5 of 2 morning OT groups offered . Approached first group hesitantly and spoke in a whispered voice. Disheveled appearance. Initiated second group focused on self management and self confidence. Spontaneous in responses with clear verbalization. Focused and organized in conversation.

## 2017-05-12 NOTE — DISCHARGE INSTRUCTIONS
Behavioral Discharge Planning and Instructions    Summary: You were admitted with suicidal thoughts. During your hospitalization, you met daily with the staff and were encouraged to attend all therapeutic programming. You met with the Clinical Treatment Coordinator and participated in your discharge planning. You are now stabilized and are discharged home.  Referrals and recommendations are listed below.   Main Diagnosis:  Major Depressive Disorder; Recurrent, Severe; PTSD; BPD; Hx of DID     Major Treatments, Procedures and Findings: Please take all of your medications as prescribed and continue with your psychiatrist and DBT programming.    Symptoms to Report: losing more sleep, mood getting worse or thoughts of suicide    Lifestyle Adjustment: Adjust your lifestyle to get enough sleep, relaxation, exercise and  good nutrition. Continue to develop healthy coping skills to decrease stress and promote a healthy living environment. No use of alcohol, illegal drugs or addictive medications other than what is currently prescribed. Attend all your appointments and take your medications as prescribed.    Psychiatry Follow-up:   Dr. Conrad (medication Management)   Therapist Kristal [facility] in [facility]  [phone]  [phone]    Patient will find new psychiatrist pending insurance changes.    DBT Therapy with [facility] [address]  [address]  [phone]    Resources:   Crisis Intervention: [phone] or [phone] (TTY: [phone]).  Call anytime for help.  National [place] on Mental Illness (www.[place].br[place]ida.org[place] 456.647.3713 or 679-856-8418.    General Medication Instructions:   See your medication sheet(s) for instructions.   Take all medicines as directed.  Make no changes unless your doctor suggests them.   Go to all your doctor visits.  Be sure to have all your required lab tests. This way, your medicines can  be refilled on time.  Do not use any drugs not prescribed by your doctor.  Avoid alcohol.    The treatment team has appreciated the opportunity to work with you.  We wish you the best in the future.  If you have any questions or concerns our unit number is [phone].
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
## 2017-05-12 NOTE — PROGRESS NOTES
Pt has been isolative to her room.  She attended community meeting but keeps to herself.  Pt reports that she doesn't want to be here.  She denies any mental health symptoms and denies any SI or SIB thinking.     05/11/17 0652   Behavioral Health   Hallucinations denies / not responding to hallucinations   Thinking distractable;poor concentration   Orientation person: oriented;place: oriented;date: oriented   Insight poor   Judgement impaired   Eye Contact (kept eyes closed)   Affect blunted, flat;tense;irritable   Mood irritable   Physical Appearance/Attire attire appropriate to age and situation   Hygiene neglected grooming - unclean body, hair, teeth   Suicidality (denies SI)   Self Injury (denies SIB thinking)   Activity isolative;withdrawn   Speech clear;coherent   Psychomotor / Gait balanced;steady   Activities of Daily Living   Hygiene/Grooming independent   Oral Hygiene independent   Dress scrubs (behavioral health)   Room Organization independent

## 2017-05-12 NOTE — PLAN OF CARE
"Problem: Depressive Symptoms  Goal: Depressive Symptoms  Signs and symptoms of listed problems will be absent or manageable.   Outcome: Therapy, progress towards functional goals is fair  Patient has been quiet on the unit. Asking early about potential discharge: \"Getting to DBT is more important than being here.\"  She denies endorsement of SI/SIB.  Feels it was a big mistake being brought to the hospital. Bartolome hallucinations. Acknowledged understanding of discharge instructions and follow-up--although somewhat of a sarcastic edge when interviewing.  Impatient--asking frequently when she could go.  Discharged home with all belongings accompanied by her mother.         "

## 2018-03-27 DIAGNOSIS — F64.0 GENDER DYSPHORIA IN ADOLESCENT AND ADULT: ICD-10-CM

## 2018-03-27 RX ORDER — ESTRADIOL 2 MG/1
4 TABLET ORAL DAILY
Qty: 180 TABLET | Refills: 0 | Status: SHIPPED | OUTPATIENT
Start: 2018-03-27

## 2018-03-27 NOTE — TELEPHONE ENCOUNTER
Request for medication refill:    Date of last visit at clinic: 09/29/2017    Please complete refill if appropriate and CLOSE ENCOUNTER.    Closing the encounter signifies the refill is complete.    If refill has been denied, please complete the smart phrase .smirefuse and route it to the [facility] RN TRIAGE pool to inform the patient and the pharmacy.    Haylie Schaefer MA

## 2018-03-28 NOTE — TELEPHONE ENCOUNTER
Medication Refill- Needs visit prior to next refill    Reason: Patient needs: provider visit and lab tests prior to next refill   Provider: I have not called the patient about the Rx denial, please call.  RN: Please contact the patient to explain reasoning provided above and to schedule the patient for a provider visit and lab tests with me.   One refill provided (90 days) so patient has time to schedule appt.    Mahad Hillman, DO

## 2020-03-10 ENCOUNTER — HEALTH MAINTENANCE LETTER (OUTPATIENT)
Age: 25
End: 2020-03-10

## 2020-12-20 ENCOUNTER — HEALTH MAINTENANCE LETTER (OUTPATIENT)
Age: 25
End: 2020-12-20

## 2021-04-24 ENCOUNTER — HEALTH MAINTENANCE LETTER (OUTPATIENT)
Age: 26
End: 2021-04-24

## 2021-10-03 ENCOUNTER — HEALTH MAINTENANCE LETTER (OUTPATIENT)
Age: 26
End: 2021-10-03

## 2021-11-11 ENCOUNTER — MEDICAL CORRESPONDENCE (OUTPATIENT)
Dept: HEALTH INFORMATION MANAGEMENT | Facility: CLINIC | Age: 26
End: 2021-11-11

## 2021-11-12 ENCOUNTER — TRANSCRIBE ORDERS (OUTPATIENT)
Dept: OTHER | Age: 26
End: 2021-11-12
Payer: COMMERCIAL

## 2021-11-12 DIAGNOSIS — F64.9 GENDER DYSPHORIA: Primary | ICD-10-CM

## 2022-05-15 ENCOUNTER — HEALTH MAINTENANCE LETTER (OUTPATIENT)
Age: 27
End: 2022-05-15
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
## 2022-09-11 ENCOUNTER — HEALTH MAINTENANCE LETTER (OUTPATIENT)
Age: 27
End: 2022-09-11

## 2023-01-13 ENCOUNTER — TRANSCRIBE ORDERS (OUTPATIENT)
Dept: OTHER | Age: 28
End: 2023-01-13

## 2023-01-13 DIAGNOSIS — F64.9 GENDER DYSPHORIA: Primary | ICD-10-CM

## 2023-01-18 ENCOUNTER — TELEPHONE (OUTPATIENT)
Dept: PLASTIC SURGERY | Facility: CLINIC | Age: 28
End: 2023-01-18
Payer: COMMERCIAL

## 2023-01-18 NOTE — CONFIDENTIAL NOTE
"[facility] :  Care Coordination Note     SITUATION   ?jace (she/her) (pronounce \"SAURAV-jose\") Timur is a 27 year old female who is receiving support for:  Care Team  .    BACKGROUND     Pt is scheduled for a new urology consult with Dr. Hsieh on 3/8/23 to discuss fusion of neovaginal tissue. Referred to Dr. Hsieh by Dr. Rea.     ASSESSMENT     Surgery              CGC Assessment       Pt reports:   Fusion of tissue in vaginal canal  Vaginoplasty 6-7 years ago with Dr. Wise in [place]      PLAN          Nursing Interventions:       Follow-up plan:  N/A       Deja Valdivia    "

## 2023-01-31 NOTE — TELEPHONE ENCOUNTER
Action 23 MMT   Action Taken  CSS sent mychart to patient requesting MARTINEZ for Dr. Wise in [place].      Action 23 MMT   Action Taken  CSS emailed MARTINEZ forms to patient at [email or web address].      Action 2023 JTV 12:16 PM    Action Taken CSS sent an urgent request to Dr Wise's clinic, fax: [phone], and [facility], email: [email or web address]     Action March 3, 2023 JTv 3:25 PM    Action Taken CSS sent a 2nd urgent request to [facility] sent an urgent request to Dr Wise's clinic, fax: [phone], and [facility], email: [email or web address]       Action 2023 JTv 10:51 AM    Action Taken Kym from Dr Jordana Wise's office called and stated that the patient did not follow up with their clinic after the 3 month follow up. Patient had Single stage penile inversion technique at [facility] on 2016. Kym from Dr Wise's office will try to get the OP report faxed over to .      Action 2023 JTV 12:38 PM    Action Taken No incoming records from Dr Wise's office. Message was sent to [facility] with update of no records.     @12:44 PM, [facility] called Dr Wise's office. No answer. [facility] LVM for a return call.     MEDICAL RECORDS REQUEST   [facility]  Urology Clinic  [address] [place] [address]  PHONE: [phone]  Fax: [phone]        FUTURE VISIT INFORMATION                                                   Ca Ding, : 1995 scheduled for future visit at [facility]    APPOINTMENT INFORMATION:    Date: 3/8/23    Provider:  Dr. Travis Hsieh    Reason for Visit/Diagnosis: Vaginoplasty revision    REFERRAL INFORMATION:    Referring provider:  Magui Rea MD    Specialty: Internal Medicine     Referring providers clinic:  [facility]     Clinic contact number:  [phone]      RECORDS REQUESTED FOR VISIT                                                      NOTES  STATUS/DETAILS   OFFICE NOTE from referring provider  yes 1/10/23, 4/15/22 - Dr. Magui Rea - PCP   OFFICE NOTE from other specialist  in process Dr. Jordana Wise   OPERATIVE REPORT pending in process 03/23/2016 -- Single stage penile Inversion technique   MEDICATION LIST  yes     PRE-VISIT CHECKLIST      Record collection complete No, in process   Appointment appropriately scheduled           (right time/right provider) Yes   Joint diagnostic appointment coordinated correctly          (ensure right order & amount of time) Yes   MyChart activation Yes   Questionnaire complete Yes

## 2023-03-08 ENCOUNTER — OFFICE VISIT (OUTPATIENT)
Dept: UROLOGY | Facility: CLINIC | Age: 28
End: 2023-03-08
Payer: COMMERCIAL

## 2023-03-08 ENCOUNTER — PRE VISIT (OUTPATIENT)
Dept: UROLOGY | Facility: CLINIC | Age: 28
End: 2023-03-08

## 2023-03-08 VITALS
SYSTOLIC BLOOD PRESSURE: 121 MMHG | HEIGHT: 69 IN | WEIGHT: 190 LBS | HEART RATE: 90 BPM | BODY MASS INDEX: 28.14 KG/M2 | DIASTOLIC BLOOD PRESSURE: 83 MMHG

## 2023-03-08 DIAGNOSIS — N89.5 VAGINAL STENOSIS: Primary | ICD-10-CM

## 2023-03-08 DIAGNOSIS — F64.9 GENDER DYSPHORIA: ICD-10-CM

## 2023-03-08 PROCEDURE — 99204 OFFICE O/P NEW MOD 45 MIN: CPT | Performed by: UROLOGY

## 2023-03-08 RX ORDER — FLUTICASONE PROPIONATE 44 UG/1
AEROSOL, METERED RESPIRATORY (INHALATION)
COMMUNITY
Start: 2023-02-03

## 2023-03-08 RX ORDER — BUSPIRONE HYDROCHLORIDE 15 MG/1
1 TABLET ORAL 2 TIMES DAILY
COMMUNITY
Start: 2022-07-26

## 2023-03-08 RX ORDER — PROGESTERONE 200 MG/1
CAPSULE ORAL
COMMUNITY
Start: 2022-10-31

## 2023-03-08 RX ORDER — DULOXETIN HYDROCHLORIDE 30 MG/1
CAPSULE, DELAYED RELEASE ORAL
COMMUNITY
Start: 2023-02-03

## 2023-03-08 RX ORDER — ALBUTEROL SULFATE 90 UG/1
1-2 AEROSOL, METERED RESPIRATORY (INHALATION)
COMMUNITY
Start: 2021-04-22
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
## 2023-03-08 NOTE — NURSING NOTE
"Chief Complaint   Patient presents with     Consult     Vaginoplasty       Blood pressure 121/83, pulse 90, height 1.753 m (5' 9\"), weight 86.2 kg (190 lb), not currently breastfeeding. Body mass index is 28.06 kg/m .    Patient Active Problem List   Diagnosis     Gender dysphoria     Hot flashes     Nausea     History of self injurious behavior     Intractable vomiting with nausea, vomiting of unspecified type     Cannabis use disorder, moderate, dependence (H)     Borderline personality disorder (H)     Posttraumatic stress disorder with dissociative symptoms     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)     Suicidal ideation     MENTAL HEALTH       No Known Allergies    Current Outpatient Medications   Medication Sig Dispense Refill     albuterol (PROAIR HFA/PROVENTIL HFA/VENTOLIN HFA) 108 (90 Base) MCG/ACT inhaler Inhale 1-2 puffs into the lungs       busPIRone (BUSPAR) 15 MG tablet Take 1 tablet by mouth 2 times daily       DULoxetine (CYMBALTA) 30 MG capsule        estradiol (ESTRACE) 2 MG tablet Take 2 tablets (4 mg) by mouth daily .  Need MD visit prior to next visit. 180 tablet 0     fluticasone (FLOVENT HFA) 44 MCG/ACT inhaler INHALE 1 PUFF BY MOUTH TWICE DAILY FOR ASTHMA       progesterone (PROMETRIUM) 200 MG capsule TAKE 1 CAPSULE(200 MG) BY MOUTH AT BEDTIME       citalopram (CELEXA) 40 MG tablet Take 1 tablet (40 mg) by mouth daily for 21 days 21 tablet 0     gabapentin (NEURONTIN) 600 MG tablet Take 2 tablets (1,200 mg) by mouth 3 times daily for 21 days 126 tablet 0     hydrOXYzine (ATARAX) 25 MG tablet Take 1-2 tablets (25-50 mg) by mouth every 4 hours as needed for anxiety 120 tablet 1     lurasidone (LATUDA) 40 MG TABS tablet Take 1 tablet (40 mg) by mouth daily 21 tablet 0     prazosin (MINIPRESS) 1 MG capsule Take 1 capsule (1 mg) by mouth At Bedtime 30 capsule 1     progesterone (PROMETRIUM) 100 MG capsule Take 1 capsule (100 mg) by mouth 2 " times daily 28 capsule 0       Social History     Tobacco Use     Smoking status: Some Days     Years: 0.50     Types: Cigarettes     Smokeless tobacco: Never   Substance Use Topics     Alcohol use: No     Alcohol/week: 0.0 standard drinks     Drug use: Yes     Types: Marijuana     Comment: cannabis  2 x since last visit       Dionisio PARRA  3/8/2023  11:54 AM
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
## 2023-04-10 ENCOUNTER — THERAPY VISIT (OUTPATIENT)
Dept: PHYSICAL THERAPY | Facility: CLINIC | Age: 28
End: 2023-04-10
Attending: UROLOGY
Payer: COMMERCIAL

## 2023-04-10 DIAGNOSIS — M99.05 SOMATIC DYSFUNCTION OF PELVIC REGION: Primary | ICD-10-CM

## 2023-04-10 DIAGNOSIS — F64.9 GENDER DYSPHORIA: ICD-10-CM

## 2023-04-10 PROCEDURE — 97110 THERAPEUTIC EXERCISES: CPT | Mod: GP

## 2023-04-10 PROCEDURE — 97530 THERAPEUTIC ACTIVITIES: CPT | Mod: GP

## 2023-04-10 PROCEDURE — 97161 PT EVAL LOW COMPLEX 20 MIN: CPT | Mod: GP

## 2023-04-10 PROCEDURE — 97140 MANUAL THERAPY 1/> REGIONS: CPT | Mod: GP
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
## 2023-04-10 NOTE — PROGRESS NOTES
[facility]    OUTPATIENT Physical Therapy ORTHOPEDIC EVALUATION  PLAN OF TREATMENT FOR OUTPATIENT REHABILITATION  (COMPLETE FOR INITIAL CLAIMS ONLY)  Patient's Last Name, First Name, M.I.  YOB: 1995  Ca Ding    Provider s Name:  [facility]   Medical Record No.  [id number]   Start of Care Date:  04/10/23   Onset Date:   03/08/23 (MD referral date)   Treatment Diagnosis:  PFD (poor relaxing PFM and urinary urgency/UUI) Medical Diagnosis:     Gender dysphoria  Somatic dysfunction of pelvic region       Goals:     04/10/23 0700   Urinary Leakage   Current Functional Level Leaking with urge;Leaking with   Performance Level brushing teeth   LTG Target Performance Decrease urinary leakage episodes in a week to   Performance Level 3   Rationale continence throughout the day;continence throughout the night;continence during work;for healthy hygiene and to prevent skin breakdown   Due Date 07/03/23   Pelvic Pain   Current Functional Level Level of pain experienced with   Performance Level pain w/ use of smallest dilator   STG Target Performance Decrease pain with   Performance Level Pt will report pain <3/10 with use of smallest dilator   Rationale painfree sitting for personal hygiene;painfree sitting for allow rest from standing;painfree sitting for community transportation;painfree sitting for job requirements in their work place;painfree intercourse;painfree yearly pelvic exams to allow detection of health related issues;for personal hygiene and use of tampons;for pain free ADL's   Due Date 05/22/23   LTG Target Performance Decrease pain with   Performance Level Pt will report pain <3/10 with use of larger dilator or intercourse   Rationale painfree sitting for personal hygiene;painfree sitting for allow rest from standing;painfree sitting for community  transportation;painfree sitting for job requirements in their work place;painfree intercourse;painfree yearly pelvic exams to allow detection of health related issues;painfree use of tampons during menstruation;for personal hygiene and use of tampons;for pain free ADL's   Due Date 07/03/23         Therapy Frequency:  2x/mo  Predicted Duration of Therapy Intervention:  3 mo    KARRI SUNSHINE, PT                 I CERTIFY THE NEED FOR THESE SERVICES FURNISHED UNDER        THIS PLAN OF TREATMENT AND WHILE UNDER MY CARE     (Physician co-signature of this document indicates review and certification of the therapy plan).                     Certification Date From:  04/10/23   Certification Date To:  07/03/23    Referring Provider:  Travis Hsieh    Initial Assessment        See Epic Evaluation SOC Date: 04/10/23

## 2023-04-20 DIAGNOSIS — N90.89 LESION OF LABIA: ICD-10-CM

## 2023-04-20 DIAGNOSIS — F64.9 GENDER DYSPHORIA: Primary | ICD-10-CM

## 2023-04-20 RX ORDER — CEFAZOLIN SODIUM 2 G/50ML
2 SOLUTION INTRAVENOUS SEE ADMIN INSTRUCTIONS
Status: CANCELLED | OUTPATIENT
Start: 2023-04-20

## 2023-04-20 RX ORDER — CEFAZOLIN SODIUM 2 G/50ML
2 SOLUTION INTRAVENOUS
Status: CANCELLED | OUTPATIENT
Start: 2023-04-20

## 2023-04-24 ENCOUNTER — TELEPHONE (OUTPATIENT)
Dept: UROLOGY | Facility: CLINIC | Age: 28
End: 2023-04-24
Payer: COMMERCIAL

## 2023-04-24 PROBLEM — N90.89 LESION OF LABIA: Status: ACTIVE | Noted: 2023-04-24

## 2023-04-24 NOTE — TELEPHONE ENCOUNTER
Patient is scheduled for surgery with Dr. Travis Hsieh    Spoke with: patient    Date of Surgery: Friday 6/9/2023    [place] [facility]    Informed patient they will need an adult  Yes    Pre op with Provider N/A    H&P: Scheduled with pcp    Additional imaging/appointments: 2-3 week post op to be scheduled    Surgery packet: Kranthi     Additional comments: N/A        Susanna Diamond on 4/24/2023 at 12:47 PM

## 2023-04-25 ENCOUNTER — TELEPHONE (OUTPATIENT)
Dept: PHYSICAL THERAPY | Facility: CLINIC | Age: 28
End: 2023-04-25

## 2023-05-09 ENCOUNTER — THERAPY VISIT (OUTPATIENT)
Dept: PHYSICAL THERAPY | Facility: CLINIC | Age: 28
End: 2023-05-09
Payer: COMMERCIAL

## 2023-05-09 DIAGNOSIS — M99.05 SOMATIC DYSFUNCTION OF PELVIC REGION: Primary | ICD-10-CM

## 2023-05-09 PROCEDURE — 97110 THERAPEUTIC EXERCISES: CPT | Mod: GP

## 2023-05-09 PROCEDURE — 97112 NEUROMUSCULAR REEDUCATION: CPT | Mod: GP

## 2023-05-23 ENCOUNTER — THERAPY VISIT (OUTPATIENT)
Dept: PHYSICAL THERAPY | Facility: CLINIC | Age: 28
End: 2023-05-23
Payer: COMMERCIAL

## 2023-05-23 DIAGNOSIS — M99.05 SOMATIC DYSFUNCTION OF PELVIC REGION: Primary | ICD-10-CM

## 2023-05-23 PROCEDURE — 97140 MANUAL THERAPY 1/> REGIONS: CPT | Mod: GP

## 2023-05-23 PROCEDURE — 97110 THERAPEUTIC EXERCISES: CPT | Mod: GP

## 2023-05-31 ENCOUNTER — TELEPHONE (OUTPATIENT)
Dept: PLASTIC SURGERY | Facility: CLINIC | Age: 28
End: 2023-05-31
Payer: COMMERCIAL

## 2023-05-31 NOTE — TELEPHONE ENCOUNTER
Pre and Post Op Patient Education                                       Diagnosis: gender dysphoria  Teaching pre and post op for bilateral labiaplasty  Person involved in teaching: patient      Motivation level: High  Asks questions: Yes  Eager to learn:  Yes  Cooperative: Yes  Receptive (willing/able to accept information): Yes    Patient demonstrates an understanding of the following:  - Date of surgery:  6/9/23 - Friday  - Location of surgery: [facility] - [address] [place] [address]     - Pre operative History/Physical other testing prior to surgery: Cost Effective Data 5/11/23  No more than 30 days prior to surgery date.   Medications to take the day of surgery: Per PCP   Blood thinner medications discussed and when to stop (if applicable): Per PCP   Diabetes medication management (if applicable): Per PCP    - Pre op covid testing: Per Dr Hsieh's request: at-home 1-2 days before surgery, will take picture of negative test and and send in via MyChart or show on day of surgery. If positive, will contact the team for surgery rescheduling.  - Post-op follow-up: 6/26/23 at 12:00 pm with Nayeli Jacques NP at [facility]    Patient verbalizes an understanding of the following:  - The need for a responsible adult  and someone to stay with them for the first 24 hours post-operatively: Yes  - NPO per anesthesia guidelines: Yes  - Pre-op showering x2 with Hibiclens/chlorhexadine soap: Yes      Discussed   - Pain management after surgery  - Infection prevention and hand hygiene  - Surgical procedure site care taught  - Signs and symptoms of infection  - Wound care and will be taught at the time of discharge  - Information about how to contact the hospital, nurse, and clinic if needed      Surgical instructions given to patient via phone.    Total time with patient: 10 minutes    Skip Cabral RN

## 2023-06-03 ENCOUNTER — HEALTH MAINTENANCE LETTER (OUTPATIENT)
Age: 28
End: 2023-06-03

## 2023-06-08 ENCOUNTER — ANESTHESIA EVENT (OUTPATIENT)
Dept: SURGERY | Facility: AMBULATORY SURGERY CENTER | Age: 28
End: 2023-06-08
Payer: COMMERCIAL

## 2023-06-08 RX ORDER — ONDANSETRON 2 MG/ML
4 INJECTION INTRAMUSCULAR; INTRAVENOUS EVERY 30 MIN PRN
Status: CANCELLED | OUTPATIENT
Start: 2023-06-08

## 2023-06-08 RX ORDER — ONDANSETRON 4 MG/1
4 TABLET, ORALLY DISINTEGRATING ORAL EVERY 30 MIN PRN
Status: CANCELLED | OUTPATIENT
Start: 2023-06-08

## 2023-06-09 ENCOUNTER — ANESTHESIA (OUTPATIENT)
Dept: SURGERY | Facility: AMBULATORY SURGERY CENTER | Age: 28
End: 2023-06-09
Payer: COMMERCIAL

## 2023-06-09 ENCOUNTER — HOSPITAL ENCOUNTER (OUTPATIENT)
Facility: AMBULATORY SURGERY CENTER | Age: 28
Discharge: HOME | End: 2023-06-09
Attending: UROLOGY
Payer: COMMERCIAL

## 2023-06-09 ENCOUNTER — TELEPHONE (OUTPATIENT)
Dept: UROLOGY | Facility: CLINIC | Age: 28
End: 2023-06-09

## 2023-06-09 VITALS
WEIGHT: 240 LBS | RESPIRATION RATE: 16 BRPM | DIASTOLIC BLOOD PRESSURE: 83 MMHG | TEMPERATURE: 98 F | HEIGHT: 69 IN | BODY MASS INDEX: 35.55 KG/M2 | SYSTOLIC BLOOD PRESSURE: 131 MMHG | HEART RATE: 84 BPM | OXYGEN SATURATION: 96 %

## 2023-06-09 DIAGNOSIS — N90.89 LESION OF LABIA: ICD-10-CM

## 2023-06-09 PROCEDURE — 14301 TIS TRNFR ANY 30.1-60 SQ CM: CPT

## 2023-06-09 PROCEDURE — 14301 TIS TRNFR ANY 30.1-60 SQ CM: CPT | Mod: XS | Performed by: UROLOGY

## 2023-06-09 RX ORDER — FENTANYL CITRATE 50 UG/ML
25 INJECTION, SOLUTION INTRAMUSCULAR; INTRAVENOUS EVERY 5 MIN PRN
Status: DISCONTINUED | OUTPATIENT
Start: 2023-06-09 | End: 2023-06-10 | Stop reason: HOSPADM

## 2023-06-09 RX ORDER — OXYCODONE HYDROCHLORIDE 5 MG/1
5 TABLET ORAL EVERY 6 HOURS PRN
Qty: 8 TABLET | Refills: 0 | Status: SHIPPED | OUTPATIENT
Start: 2023-06-09 | End: 2023-06-10

## 2023-06-09 RX ORDER — HYDROMORPHONE HYDROCHLORIDE 1 MG/ML
0.2 INJECTION, SOLUTION INTRAMUSCULAR; INTRAVENOUS; SUBCUTANEOUS EVERY 5 MIN PRN
Status: DISCONTINUED | OUTPATIENT
Start: 2023-06-09 | End: 2023-06-10 | Stop reason: HOSPADM

## 2023-06-09 RX ORDER — PROPOFOL 10 MG/ML
INJECTION, EMULSION INTRAVENOUS PRN
Status: DISCONTINUED | OUTPATIENT
Start: 2023-06-09 | End: 2023-06-09

## 2023-06-09 RX ORDER — SODIUM CHLORIDE, SODIUM LACTATE, POTASSIUM CHLORIDE, CALCIUM CHLORIDE 600; 310; 30; 20 MG/100ML; MG/100ML; MG/100ML; MG/100ML
INJECTION, SOLUTION INTRAVENOUS CONTINUOUS
Status: DISCONTINUED | OUTPATIENT
Start: 2023-06-09 | End: 2023-06-10 | Stop reason: HOSPADM

## 2023-06-09 RX ORDER — PROPOFOL 10 MG/ML
INJECTION, EMULSION INTRAVENOUS CONTINUOUS PRN
Status: DISCONTINUED | OUTPATIENT
Start: 2023-06-09 | End: 2023-06-09

## 2023-06-09 RX ORDER — GABAPENTIN 300 MG/1
300 CAPSULE ORAL
Status: COMPLETED | OUTPATIENT
Start: 2023-06-09 | End: 2023-06-09

## 2023-06-09 RX ORDER — CEFAZOLIN SODIUM 2 G/50ML
2 SOLUTION INTRAVENOUS SEE ADMIN INSTRUCTIONS
Status: DISCONTINUED | OUTPATIENT
Start: 2023-06-09 | End: 2023-06-10 | Stop reason: HOSPADM

## 2023-06-09 RX ORDER — ACETAMINOPHEN 325 MG/1
975 TABLET ORAL ONCE
Status: COMPLETED | OUTPATIENT
Start: 2023-06-09 | End: 2023-06-09

## 2023-06-09 RX ORDER — LIDOCAINE 40 MG/G
CREAM TOPICAL
Status: DISCONTINUED | OUTPATIENT
Start: 2023-06-09 | End: 2023-06-10 | Stop reason: HOSPADM

## 2023-06-09 RX ORDER — FENTANYL CITRATE 50 UG/ML
50 INJECTION, SOLUTION INTRAMUSCULAR; INTRAVENOUS EVERY 5 MIN PRN
Status: DISCONTINUED | OUTPATIENT
Start: 2023-06-09 | End: 2023-06-10 | Stop reason: HOSPADM

## 2023-06-09 RX ORDER — FENTANYL CITRATE 50 UG/ML
INJECTION, SOLUTION INTRAMUSCULAR; INTRAVENOUS PRN
Status: DISCONTINUED | OUTPATIENT
Start: 2023-06-09 | End: 2023-06-09

## 2023-06-09 RX ORDER — GLYCOPYRROLATE 0.2 MG/ML
INJECTION, SOLUTION INTRAMUSCULAR; INTRAVENOUS PRN
Status: DISCONTINUED | OUTPATIENT
Start: 2023-06-09 | End: 2023-06-09

## 2023-06-09 RX ORDER — CEFAZOLIN SODIUM 2 G/50ML
2 SOLUTION INTRAVENOUS
Status: COMPLETED | OUTPATIENT
Start: 2023-06-09 | End: 2023-06-09

## 2023-06-09 RX ORDER — ONDANSETRON 2 MG/ML
4 INJECTION INTRAMUSCULAR; INTRAVENOUS EVERY 30 MIN PRN
Status: DISCONTINUED | OUTPATIENT
Start: 2023-06-09 | End: 2023-06-10 | Stop reason: HOSPADM

## 2023-06-09 RX ORDER — ONDANSETRON 2 MG/ML
INJECTION INTRAMUSCULAR; INTRAVENOUS PRN
Status: DISCONTINUED | OUTPATIENT
Start: 2023-06-09 | End: 2023-06-09

## 2023-06-09 RX ORDER — ONDANSETRON 4 MG/1
4 TABLET, ORALLY DISINTEGRATING ORAL EVERY 30 MIN PRN
Status: DISCONTINUED | OUTPATIENT
Start: 2023-06-09 | End: 2023-06-10 | Stop reason: HOSPADM

## 2023-06-09 RX ORDER — POLYETHYLENE GLYCOL 3350 17 G/17G
1 POWDER, FOR SOLUTION ORAL DAILY
Qty: 255 G | Refills: 0 | Status: SHIPPED | OUTPATIENT
Start: 2023-06-09

## 2023-06-09 RX ORDER — DEXAMETHASONE SODIUM PHOSPHATE 4 MG/ML
INJECTION, SOLUTION INTRA-ARTICULAR; INTRALESIONAL; INTRAMUSCULAR; INTRAVENOUS; SOFT TISSUE PRN
Status: DISCONTINUED | OUTPATIENT
Start: 2023-06-09 | End: 2023-06-09

## 2023-06-09 RX ORDER — HYDROMORPHONE HYDROCHLORIDE 1 MG/ML
0.4 INJECTION, SOLUTION INTRAMUSCULAR; INTRAVENOUS; SUBCUTANEOUS EVERY 5 MIN PRN
Status: DISCONTINUED | OUTPATIENT
Start: 2023-06-09 | End: 2023-06-10 | Stop reason: HOSPADM

## 2023-06-09 RX ORDER — BUPIVACAINE HYDROCHLORIDE AND EPINEPHRINE 5; 5 MG/ML; UG/ML
INJECTION, SOLUTION EPIDURAL; INTRACAUDAL; PERINEURAL PRN
Status: DISCONTINUED | OUTPATIENT
Start: 2023-06-09 | End: 2023-06-09 | Stop reason: HOSPADM

## 2023-06-09 RX ORDER — LIDOCAINE HYDROCHLORIDE 20 MG/ML
INJECTION, SOLUTION INFILTRATION; PERINEURAL PRN
Status: DISCONTINUED | OUTPATIENT
Start: 2023-06-09 | End: 2023-06-09

## 2023-06-09 RX ORDER — OXYCODONE HYDROCHLORIDE 5 MG/1
5 TABLET ORAL
Status: DISCONTINUED | OUTPATIENT
Start: 2023-06-09 | End: 2023-06-10 | Stop reason: HOSPADM

## 2023-06-09 RX ADMIN — PROPOFOL 200 MCG/KG/MIN: 10 INJECTION, EMULSION INTRAVENOUS at 10:51

## 2023-06-09 RX ADMIN — FENTANYL CITRATE 25 MCG: 50 INJECTION, SOLUTION INTRAMUSCULAR; INTRAVENOUS at 10:44

## 2023-06-09 RX ADMIN — GABAPENTIN 300 MG: 300 CAPSULE ORAL at 09:52

## 2023-06-09 RX ADMIN — FENTANYL CITRATE 50 MCG: 50 INJECTION, SOLUTION INTRAMUSCULAR; INTRAVENOUS at 11:30

## 2023-06-09 RX ADMIN — Medication 0.2 MG: at 12:07

## 2023-06-09 RX ADMIN — ACETAMINOPHEN 975 MG: 325 TABLET ORAL at 09:52

## 2023-06-09 RX ADMIN — GLYCOPYRROLATE 0.2 MG: 0.2 INJECTION, SOLUTION INTRAMUSCULAR; INTRAVENOUS at 10:46

## 2023-06-09 RX ADMIN — FENTANYL CITRATE 25 MCG: 50 INJECTION, SOLUTION INTRAMUSCULAR; INTRAVENOUS at 11:25

## 2023-06-09 RX ADMIN — SODIUM CHLORIDE, SODIUM LACTATE, POTASSIUM CHLORIDE, CALCIUM CHLORIDE: 600; 310; 30; 20 INJECTION, SOLUTION INTRAVENOUS at 09:53

## 2023-06-09 RX ADMIN — Medication 0.3 MG: at 12:02

## 2023-06-09 RX ADMIN — CEFAZOLIN SODIUM 2 G: 2 SOLUTION INTRAVENOUS at 10:39

## 2023-06-09 RX ADMIN — PROPOFOL 150 MCG/KG/MIN: 10 INJECTION, EMULSION INTRAVENOUS at 11:36

## 2023-06-09 RX ADMIN — ONDANSETRON 4 MG: 2 INJECTION INTRAMUSCULAR; INTRAVENOUS at 12:06

## 2023-06-09 RX ADMIN — LIDOCAINE HYDROCHLORIDE 60 MG: 20 INJECTION, SOLUTION INFILTRATION; PERINEURAL at 10:51

## 2023-06-09 RX ADMIN — DEXAMETHASONE SODIUM PHOSPHATE 4 MG: 4 INJECTION, SOLUTION INTRA-ARTICULAR; INTRALESIONAL; INTRAMUSCULAR; INTRAVENOUS; SOFT TISSUE at 10:56

## 2023-06-09 RX ADMIN — PROPOFOL 200 MG: 10 INJECTION, EMULSION INTRAVENOUS at 10:51

## 2023-06-09 NOTE — OP NOTE
PREOPERATIVE DIAGNOSIS: insufficient labia majora  POSTOPERATIVE DIAGNOSIS:  insufficient labia    PROCEDURES:   1. Bilateral Labiaplasty Using Adjacent tissue transfer - (YV Plasty - 9x4cm on each side)    Date: June 9, 2023    SURGEON: Travis Hsieh MD  FELLOW: Ryan Olson MD  ASSISTANT: Emerita Nice MD      INDICATIONS:  Malcom Ding is a 28 year old transfemale with history of full depth vaginoplasty done at OSH. She is healed. She has a canal which is somewhat narrowed. She also has abnormal labia majora which are essentially mounds of labial fat anteriorly rather than usual lenticular shaped labia majora. We discussed labia majora revision using YV plasty to lower the inferior most tip of the labia majora. She also has an incision across the labia majora which she does not like, but I discussed I worried about blood supply if we tried to combine excision of that scar line and the above-mentioned labia majora revision. Thus, we agreed on the above procedures. She wishes to proceed with revision and understands the risks to include but not be limited to bleeding, infection, pain or numbness, hematoma and wishes to proceed.     DESCRIPTION OF PROCEDURE:   After informed consent was obtained and preoperative antibiotics were given, the patient was taken to the operating room and placed supine on the operating table. General anesthesia was induced. She was was intubated. The genitals were shaved, prepped and draped in the usual sterile fashion.    We began by performing a Y-V plast for the labia majora.  We marked out the Y incision outlining the inferior aspect of the labial fat pad and then incised with a knife. The incision went around the tissue mound. The bottom of the Y inferiorly traveled down along the introitus down to the canal opening. We then used Bovie cautery to dissect through the tissue.  We kept the deep tissues with skin flap in order to preserve the blood supply and transfer  labial fat inferiorly.  This was performed on the right and left sides so that the incisions were symmetric. Ultimately each flap measured 9x4cm. Hemostasis was obtained. Once the flaps could easily be reapproximated inferiorly, we then placed  interrupted subcutaneous sutures with 3-0 Vicryl were used to hold the flaps in place. Slight dog-ears were trimmed. Interrupted sutures were placed in the a subcutaneous tissue along the length of each flap.  The incisions were then sewn using 4-0 Monocryl suture in a running baseball fashion for the superior portion and horizontal mattress for the inferior portion.  In the end this provided significant bulk to the labia posteriorly, around the introitus, which was far more physiologic. The prior extra incision scars were also flattened somewhat due to the mild tension.  Exofin glue was placed over the suture lines.    The procedure was well-tolerated the patient, she was awakened from anesthesia and brought to the PACU for recovery.  There were no complications.    Body mass index is 35.44 kg/m .    As attending surgeon, Travis CA MD, was scrubbed and present for the entire procedure.

## 2023-06-09 NOTE — DISCHARGE INSTRUCTIONS
[facility]  Home Care Following Anesthesia  For 24 hours after surgery:  Get plenty of rest.  A responsible adult must stay with you for at least 24 hours after you leave the surgery center.  Do not drive or use heavy equipment.  If you have weakness or tingling, don't drive or use heavy equipment until this feeling goes away.   Do not drink alcohol.   Avoid strenuous or risky activities.  Ask for help when climbing stairs.  You may feel lightheaded.  IF so, sit for a few minutes before standing.  Have someone help you get up.   If you have nausea (feel sick to your stomach): Drink only clear liquids such as apple juice, ginger ale, broth or 7-Up.  Rest may also help.  Be sure to drink enough fluids.  Move to a regular diet as you feel able.   You may have a slight fever.  Call the doctor if your fever is over 100 F (37.7 C) (taken under the tongue) or lasts longer than 24 hours.  You may have a dry mouth, a sore throat, muscle aches or trouble sleeping. These should go away after 24 hours.  Do not make important or legal decisions.   It is recommended to avoid smoking.               Tips for taking pain medications  To get the best pain relief possible, remember these points:  Take pain medications as directed, before pain becomes severe.  Pain medication can upset your stomach: taking it with food may help.  Constipation is a common side effect of pain medication. Drink plenty of  fluids.  Eat foods high in fiber. Take a stool softener if recommended by your doctor or pharmacist.  Do not drink alcohol, drive or operate machinery while taking pain medications.  Ask about other ways to control pain, such as with heat, ice or relaxation.    Tylenol/Acetaminophen Consumption  To help encourage the safe use of acetaminophen, the makers of TYLENOL  have lowered the maximum daily dose for single-ingredient Extra Strength TYLENOL  (acetaminophen) products sold in the [place] from 8 pills  per day (4,000 mg) to 6 pills per day (3,000 mg). The dosing interval has also changed from 2 pills every 4-6 hours to 2 pills every 6 hours.  If you feel your pain relief is insufficient, you may take Tylenol/Acetaminophen in addition to your narcotic pain medication.   Be careful not to exceed 3,000 mg of Tylenol/Acetaminophen in a 24 hour period from all sources.  If you are taking extra strength Tylenol/acetaminophen (500 mg), the maximum dose is 6 tablets in 24 hours.  If you are taking regular strength acetaminophen (325 mg), the maximum dose is 9 tablets in 24 hours.  You received 975 mg of Tylenol at 9.52 AM. Next dose is available at 3:52 PM as needed per package instruction.    Call a doctor for any of the following:  Signs of infection (fever, growing tenderness at the surgery site, a large amount of drainage or bleeding, severe pain, foul-smelling drainage, redness, swelling).  It has been over 8 to 10 hours since surgery and you are still not able to urinate (pass water).  Headache for over 24 hours.  Numbness, tingling or weakness the day after surgery (if you had spinal anesthesia).  Signs of Covid-19 infection (temperature over 100 degrees, shortness of breath, cough, loss of taste/smell, generalized body aches, persistent headache, chills, sore throat, nausea/vomiting/diarrhea)  Your doctor is:       Dr. Travis Hsieh, Prostate and Urology: [phone]               Or dial [phone] and ask for the resident on call for:  Prostate Urology  For emergency care, call the:  [place] [facility]:  [phone] (TTY for hearing impaired: [phone])

## 2023-06-09 NOTE — BRIEF OP NOTE
[facility]    Brief Operative Note    Pre-operative diagnosis: Gender dysphoria [F64.9]  Lesion of labia [N90.89]  Post-operative diagnosis Same as pre-operative diagnosis    Procedure: Procedure(s):  Bilateral Labiaplasty  Surgeon: Surgeon(s) and Role:     * Travis Hsieh MD - Primary     * Emerita Nice MD - Fellow - Assisting     * Ryan Olson MD - Fellow - Assisting  Anesthesia: General   Estimated Blood Loss: 25 mL    Drains: None  Specimens: * No specimens in log *  Findings:   None.  Complications: None.  Implants: * No implants in log *
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
## 2023-06-09 NOTE — ANESTHESIA POSTPROCEDURE EVALUATION
Patient: Ca Ding    Procedure: Procedure(s):  Bilateral Labiaplasty       Anesthesia Type:  General    Note:  Disposition: Outpatient   Postop Pain Control: Uneventful            Sign Out: Well controlled pain   PONV: No   Neuro/Psych: Uneventful            Sign Out: Acceptable/Baseline neuro status   Airway/Respiratory: Uneventful            Sign Out: Acceptable/Baseline resp. status   CV/Hemodynamics: Uneventful            Sign Out: Acceptable CV status; No obvious hypovolemia; No obvious fluid overload   Other NRE: NONE   DID A NON-ROUTINE EVENT OCCUR? No           Last vitals:  Vitals Value Taken Time   /75 06/09/23 1330   Temp 36.5  C (97.7  F) 06/09/23 1330   Pulse 95 06/09/23 1325   Resp 14 06/09/23 1330   SpO2 94 % 06/09/23 1332   Vitals shown include unvalidated device data.    Electronically Signed By: Tyler Dodson MD  June 9, 2023  3:44 PM

## 2023-06-09 NOTE — TELEPHONE ENCOUNTER
[facility] Call Center    Phone Message    May a detailed message be left on voicemail: yes     Reason for Call: Pedro Luis from [facility] pharmacy, is calling in regards to pt's medication from procedure today 6//23. Please call Pedro Luis, [phone]      Action Taken: Other: Uro    Travel Screening: Not Applicable

## 2023-06-09 NOTE — ANESTHESIA PROCEDURE NOTES
Airway         Procedure Start/Stop Times: 6/9/2023 11:15 AM  Staff -        CRNA: Merna Guillen       Performed By: CRNA and with residents       Procedure performed by resident/fellow/CRNA in presence of a teaching physician.    Consent for Airway        Urgency: elective  Indications and Patient Condition       Indications for airway management: fernanda-procedural       Induction type:intravenous       Mask difficulty assessment: 1 - vent by mask    Final Airway Details       Final airway type: supraglottic airway    Supraglottic Airway Details        Type: LMA       Brand: I-Gel       LMA size: 4    Post intubation assessment        Placement verified by: capnometry, equal breath sounds and chest rise        Number of attempts at approach: 1       Number of other approaches attempted: 0       Secured with: silk tape       Ease of procedure: easy       Dentition: Intact and Unchanged    Medication(s) Administered   Medication Administration Time: 6/9/2023 11:15 AM

## 2023-06-09 NOTE — ANESTHESIA CARE TRANSFER NOTE
Patient: Ca Ding    Procedure: Procedure(s):  Bilateral Labiaplasty       Diagnosis: Gender dysphoria [F64.9]  Lesion of labia [N90.89]  Diagnosis Additional Information: No value filed.    Anesthesia Type:   General     Note:    Oropharynx: oropharynx clear of all foreign objects and spontaneously breathing  Level of Consciousness: awake  Oxygen Supplementation: face mask  Level of Supplemental Oxygen (L/min / FiO2): 6  Independent Airway: airway patency satisfactory and stable  Dentition: dentition unchanged  Vital Signs Stable: post-procedure vital signs reviewed and stable  Report to RN Given: handoff report given  Patient transferred to: PACU    Handoff Report: Identifed the Patient, Identified the Reponsible Provider, Reviewed the pertinent medical history, Discussed the surgical course, Reviewed Intra-OP anesthesia mangement and issues during anesthesia, Set expectations for post-procedure period and Allowed opportunity for questions and acknowledgement of understanding      Vitals:  Vitals Value Taken Time   /61 06/09/23 1248   Temp 98.1    Pulse 118 06/09/23 1251   Resp 42 06/09/23 1251   SpO2 92 % 06/09/23 1251   Vitals shown include unvalidated device data.    Electronically Signed By: MIRELLA ROJAS  June 9, 2023  12:52 PM

## 2023-06-10 ENCOUNTER — TELEPHONE (OUTPATIENT)
Dept: SURGERY | Facility: CLINIC | Age: 28
End: 2023-06-10
Payer: COMMERCIAL

## 2023-06-10 DIAGNOSIS — N90.89 LESION OF LABIA: Primary | ICD-10-CM

## 2023-06-10 RX ORDER — HYDROCODONE BITARTRATE AND ACETAMINOPHEN 5; 325 MG/1; MG/1
1 TABLET ORAL EVERY 6 HOURS PRN
Qty: 10 TABLET | Refills: 0 | Status: SHIPPED | OUTPATIENT
Start: 2023-06-10 | End: 2023-06-13

## 2023-06-10 NOTE — TELEPHONE ENCOUNTER
Spoke to pt - oxycodone causes nausea. Would prefer hydrocodone. Their usual pharmacy closed.     But the [facility] pharmacy open tomorrow 8am and okay with that     I will send there

## 2023-06-12 NOTE — TELEPHONE ENCOUNTER
Called Pedro Luis who said there was an issue filling a medication prescribed for pt a few days ago. Explained that the medication was sent to a different pharmacy, which is why there may have been an issue. Pedro Luis said there are no other issues at this time.

## 2023-06-26 ENCOUNTER — OFFICE VISIT (OUTPATIENT)
Dept: PLASTIC SURGERY | Facility: CLINIC | Age: 28
End: 2023-06-26
Payer: COMMERCIAL

## 2023-06-26 VITALS
SYSTOLIC BLOOD PRESSURE: 114 MMHG | BODY MASS INDEX: 34.51 KG/M2 | HEIGHT: 69 IN | OXYGEN SATURATION: 97 % | DIASTOLIC BLOOD PRESSURE: 77 MMHG | HEART RATE: 97 BPM | WEIGHT: 233 LBS

## 2023-06-26 DIAGNOSIS — F64.9 GENDER DYSPHORIA: Primary | ICD-10-CM

## 2023-06-26 PROCEDURE — 99024 POSTOP FOLLOW-UP VISIT: CPT | Performed by: NURSE PRACTITIONER

## 2023-06-26 ASSESSMENT — PAIN SCALES - GENERAL: PAINLEVEL: MODERATE PAIN (5)
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
## 2023-06-26 NOTE — LETTER
"6/26/2023       RE: Ca Ding  [address] [address]     Dear Colleague,    Thank you for referring your patient, Ca Ding, to the [facility] [place] at [facility]. Please see a copy of my visit note below.    SUBJECTIVE:  Malcom is a 28 year old transgender female here for post-op visit after undergoing bilateral YV labiaplasty on 6/9/23 with Dr Hsieh. She reports recovery has been going okay but things have been painful. She is not taking anything for pain since she ran out of post-op Rx. She has not looked at her vulva since surgery, stating \"I was traumatized when I looked after my first surgery so I wanted to wait.\" She is open to looking today with mirror in clinic. She is wondering if there is anything she can use to help minimize scarring.    OBJECTIVE:  /77 (BP Location: Left arm, Patient Position: Chair, Cuff Size: Adult Large)   Pulse 97   Ht 1.753 m (5' 9\")   Wt 105.7 kg (233 lb)   SpO2 97%   BMI 34.41 kg/m     General: NAD  Genital: Surgical incisions healing well. Incisions C/D/I. Some generalized swelling of bilateral labia as expected for after surgery.   Small area of fibrinous exudate on innermost aspect of inferior left labial incision. No bruising, erythema, or drainage.  Bilateral horizontal folds on labia superior to surgical incisions (existing prior to surgery)      ASSESSMENT/PLAN:  S/P bilateral YV labiaplasty healing well.  Okay to apply Aquaphor or silicone scar tape to incisions to help with scarring.  Photo uploaded to chart to follow progress  RTC to see Dr Hsieh 4-6 months PO    A total of 20 minutes was spent today with patient, reviewing records, completing charting, and other tasks as detailed above.          Again, thank you for allowing me to participate in the care of your patient.      Sincerely,    AUSTIN Zuniga CNP      "

## 2023-06-26 NOTE — NURSING NOTE
"Chief Complaint   Patient presents with     JASON     Erikamarpilar, is being seen today for a  3 week post-op DOS 6/9/23, no concerns at this time, as reported by patient.       Vitals:    06/26/23 1205   BP: 114/77   BP Location: Left arm   Patient Position: Chair   Cuff Size: Adult Large   Pulse: 97   SpO2: 97%   Weight: 105.7 kg (233 lb)   Height: 1.753 m (5' 9\")       Body mass index is 34.41 kg/m .      Lurdes Epstein LPN    "

## 2023-08-03 PROBLEM — M99.05 SOMATIC DYSFUNCTION OF PELVIC REGION: Status: RESOLVED | Noted: 2023-04-10 | Resolved: 2023-08-03
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
## 2023-10-24 ENCOUNTER — OFFICE VISIT (OUTPATIENT)
Dept: PLASTIC SURGERY | Facility: CLINIC | Age: 28
End: 2023-10-24
Payer: COMMERCIAL

## 2023-10-24 VITALS
WEIGHT: 238.7 LBS | HEIGHT: 69 IN | SYSTOLIC BLOOD PRESSURE: 120 MMHG | OXYGEN SATURATION: 97 % | BODY MASS INDEX: 35.35 KG/M2 | HEART RATE: 91 BPM | DIASTOLIC BLOOD PRESSURE: 82 MMHG

## 2023-10-24 DIAGNOSIS — F64.9 GENDER DYSPHORIA: Primary | ICD-10-CM

## 2023-10-24 PROCEDURE — 99213 OFFICE O/P EST LOW 20 MIN: CPT | Mod: KX | Performed by: UROLOGY

## 2023-10-24 ASSESSMENT — PAIN SCALES - GENERAL: PAINLEVEL: MILD PAIN (3)
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
## 2023-10-24 NOTE — NURSING NOTE
"Chief Complaint   Patient presents with    Surgical Followup     Qamra is being seen today for a 4 month post-op, DOS 6/9/23.       Vitals:    10/24/23 1443   BP: 120/82   BP Location: Left arm   Patient Position: Sitting   Cuff Size: Adult Large   Pulse: 91   SpO2: 97%   Weight: 108.3 kg (238 lb 11.2 oz)   Height: 1.753 m (5' 9\")       Body mass index is 35.25 kg/m .    Patient reports mild genital and back pain, 3/10.    Manjinder Lovett, EMT    "
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
## 2024-01-12 ENCOUNTER — HOSPITAL ENCOUNTER (EMERGENCY)
Facility: CLINIC | Age: 29
Discharge: HOME | End: 2024-01-13
Attending: EMERGENCY MEDICINE | Admitting: EMERGENCY MEDICINE
Payer: COMMERCIAL

## 2024-01-12 ENCOUNTER — APPOINTMENT (OUTPATIENT)
Dept: GENERAL RADIOLOGY | Facility: CLINIC | Age: 29
End: 2024-01-12
Attending: EMERGENCY MEDICINE
Payer: COMMERCIAL

## 2024-01-12 DIAGNOSIS — F17.210 CIGARETTE SMOKER: Primary | ICD-10-CM

## 2024-01-12 DIAGNOSIS — F15.10 NONDEPENDENT AMPHETAMINE OR RELATED ACTING SYMPATHOMIMETIC ABUSE, CONTINUOUS (H): ICD-10-CM

## 2024-01-12 DIAGNOSIS — J06.9 VIRAL URI: ICD-10-CM

## 2024-01-12 DIAGNOSIS — G90.A POTS (POSTURAL ORTHOSTATIC TACHYCARDIA SYNDROME): ICD-10-CM

## 2024-01-12 DIAGNOSIS — R25.9 ABNORMAL MOVEMENTS: ICD-10-CM

## 2024-01-12 LAB
ALBUMIN SERPL BCG-MCNC: 3.8 G/DL (ref 3.5–5.2)
ALP SERPL-CCNC: 68 U/L (ref 40–150)
ALT SERPL W P-5'-P-CCNC: 16 U/L (ref 0–70)
ANION GAP SERPL CALCULATED.3IONS-SCNC: 11 MMOL/L (ref 7–15)
AST SERPL W P-5'-P-CCNC: 18 U/L (ref 0–45)
BASOPHILS # BLD AUTO: 0.1 10E3/UL (ref 0–0.2)
BASOPHILS NFR BLD AUTO: 1 %
BILIRUB SERPL-MCNC: 0.2 MG/DL
BUN SERPL-MCNC: 11.2 MG/DL (ref 6–20)
CALCIUM SERPL-MCNC: 9.2 MG/DL (ref 8.6–10)
CHLORIDE SERPL-SCNC: 105 MMOL/L (ref 98–107)
CREAT SERPL-MCNC: 0.59 MG/DL (ref 0.51–1.17)
DEPRECATED HCO3 PLAS-SCNC: 22 MMOL/L (ref 22–29)
EGFRCR SERPLBLD CKD-EPI 2021: >90 ML/MIN/1.73M2
EOSINOPHIL # BLD AUTO: 0.5 10E3/UL (ref 0–0.7)
EOSINOPHIL NFR BLD AUTO: 4 %
ERYTHROCYTE [DISTWIDTH] IN BLOOD BY AUTOMATED COUNT: 13.7 % (ref 10–15)
FLUAV RNA SPEC QL NAA+PROBE: NEGATIVE
FLUBV RNA RESP QL NAA+PROBE: NEGATIVE
GLUCOSE SERPL-MCNC: 104 MG/DL (ref 70–99)
HCT VFR BLD AUTO: 38.2 % (ref 35–53)
HGB BLD-MCNC: 13 G/DL (ref 11.7–17.7)
IMM GRANULOCYTES # BLD: 0.1 10E3/UL
IMM GRANULOCYTES NFR BLD: 1 %
LIPASE SERPL-CCNC: 29 U/L (ref 13–60)
LYMPHOCYTES # BLD AUTO: 3.4 10E3/UL (ref 0.8–5.3)
LYMPHOCYTES NFR BLD AUTO: 33 %
MAGNESIUM SERPL-MCNC: 2 MG/DL (ref 1.7–2.3)
MCH RBC QN AUTO: 27.7 PG (ref 26.5–33)
MCHC RBC AUTO-ENTMCNC: 34 G/DL (ref 31.5–36.5)
MCV RBC AUTO: 81 FL (ref 78–100)
MONOCYTES # BLD AUTO: 0.8 10E3/UL (ref 0–1.3)
MONOCYTES NFR BLD AUTO: 8 %
NEUTROPHILS # BLD AUTO: 5.4 10E3/UL (ref 1.6–8.3)
NEUTROPHILS NFR BLD AUTO: 53 %
NRBC # BLD AUTO: 0 10E3/UL
NRBC BLD AUTO-RTO: 0 /100
PLATELET # BLD AUTO: 328 10E3/UL (ref 150–450)
POTASSIUM SERPL-SCNC: 4.1 MMOL/L (ref 3.4–5.3)
PROT SERPL-MCNC: 6.9 G/DL (ref 6.4–8.3)
RBC # BLD AUTO: 4.69 10E6/UL (ref 3.8–5.9)
RSV RNA SPEC NAA+PROBE: NEGATIVE
SARS-COV-2 RNA RESP QL NAA+PROBE: NEGATIVE
SODIUM SERPL-SCNC: 138 MMOL/L (ref 135–145)
WBC # BLD AUTO: 10.2 10E3/UL (ref 4–11)

## 2024-01-12 PROCEDURE — 85025 COMPLETE CBC W/AUTO DIFF WBC: CPT | Performed by: EMERGENCY MEDICINE

## 2024-01-12 PROCEDURE — 83690 ASSAY OF LIPASE: CPT | Performed by: EMERGENCY MEDICINE

## 2024-01-12 PROCEDURE — 99284 EMERGENCY DEPT VISIT MOD MDM: CPT | Mod: 25

## 2024-01-12 PROCEDURE — 99284 EMERGENCY DEPT VISIT MOD MDM: CPT | Performed by: EMERGENCY MEDICINE

## 2024-01-12 PROCEDURE — 87637 SARSCOV2&INF A&B&RSV AMP PRB: CPT | Performed by: EMERGENCY MEDICINE

## 2024-01-12 PROCEDURE — 85379 FIBRIN DEGRADATION QUANT: CPT | Performed by: EMERGENCY MEDICINE

## 2024-01-12 PROCEDURE — 80053 COMPREHEN METABOLIC PANEL: CPT | Performed by: EMERGENCY MEDICINE

## 2024-01-12 PROCEDURE — 36415 COLL VENOUS BLD VENIPUNCTURE: CPT | Performed by: EMERGENCY MEDICINE

## 2024-01-12 PROCEDURE — 71046 X-RAY EXAM CHEST 2 VIEWS: CPT

## 2024-01-12 PROCEDURE — 83735 ASSAY OF MAGNESIUM: CPT | Performed by: EMERGENCY MEDICINE

## 2024-01-12 ASSESSMENT — ACTIVITIES OF DAILY LIVING (ADL): ADLS_ACUITY_SCORE: 35

## 2024-01-13 VITALS
TEMPERATURE: 98.3 F | DIASTOLIC BLOOD PRESSURE: 88 MMHG | HEART RATE: 84 BPM | OXYGEN SATURATION: 97 % | SYSTOLIC BLOOD PRESSURE: 139 MMHG | RESPIRATION RATE: 25 BRPM

## 2024-01-13 LAB — D DIMER PPP FEU-MCNC: <0.27 UG/ML FEU (ref 0–0.5)

## 2024-01-13 NOTE — DISCHARGE INSTRUCTIONS
I put in a referral for neurology.  They will contact you for follow-up.    Please return to the ER for new or worsening symptoms.

## 2024-01-13 NOTE — ED TRIAGE NOTES
"Pt states she has been having many symptoms  for last two months such as cough, shortness breath, weakness, body aches, vomiting and diarrhea. \"It getting worse not getting better\".      "
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
## 2024-04-08 NOTE — TELEPHONE ENCOUNTER
Action Andie Hutton on 4/8/2024 at 4:24 PM   Action Taken Attempted to call Pt to ask if seen anywhere else for  abnormal movements. Pt's partner answered phone and stated that Pt is in process of being discharged from [facility] [place] [facility] [place]. Asked that Pt be called back tomorrow. -ALEJANDRO     Action Andie Hutton on 4/9/2024 at 9:12 AM   Action Taken Attempted to call Pt again (Pt shares phone w/ partner). Partner stated Pt is still sleeping/recovering from ED visit previous day. Pt took down phone number so that Pt can call on their time. -ALEJANDRO     RECORDS RECEIVED FROM: Care Everywhere   REASON FOR VISIT: Abnormal movements   PROVIDER: Jeffery Fulton DO   DATE OF APPT: 6/5/24 @ 2:00 pm    NOTES (FOR ALL VISITS) STATUS DETAILS   OFFICE NOTE from referring provider Internal ED Referral    DISCHARGE REPORT from the ER Internal 1/12/24-1/13/24 Felix Lawrence MD @[facility] ED     MEDICATION LIST Internal    IMAGING  (FOR ALL VISITS)     MRI (HEAD, NECK, SPINE) N/A    CT (HEAD, NECK, SPINE) N/A

## 2024-06-05 ENCOUNTER — PRE VISIT (OUTPATIENT)
Dept: NEUROLOGY | Facility: CLINIC | Age: 29
End: 2024-06-05

## 2024-07-07 ENCOUNTER — HEALTH MAINTENANCE LETTER (OUTPATIENT)
Age: 29
End: 2024-07-07

## 2024-09-16 ENCOUNTER — TRANSCRIBE ORDERS (OUTPATIENT)
Dept: OTHER | Age: 29
End: 2024-09-16

## 2024-09-16 DIAGNOSIS — F64.9 GENDER DYSPHORIA: Primary | ICD-10-CM

## 2024-09-30 ENCOUNTER — TRANSCRIBE ORDERS (OUTPATIENT)
Dept: OTHER | Age: 29
End: 2024-09-30

## 2024-09-30 DIAGNOSIS — R14.0 BLOATING: ICD-10-CM

## 2024-09-30 DIAGNOSIS — R19.5 MUCUS IN STOOL: ICD-10-CM

## 2024-09-30 DIAGNOSIS — R19.8 ALTERNATING CONSTIPATION AND DIARRHEA: ICD-10-CM

## 2024-09-30 DIAGNOSIS — R19.5 INCREASED STOOL VOLUME: ICD-10-CM

## 2024-09-30 DIAGNOSIS — R11.0 NAUSEA: ICD-10-CM

## 2024-09-30 DIAGNOSIS — K21.00 GASTROESOPHAGEAL REFLUX DISEASE WITH ESOPHAGITIS, UNSPECIFIED WHETHER HEMORRHAGE: ICD-10-CM

## 2024-09-30 DIAGNOSIS — R19.5 CHANGE IN STOOL CALIBER: Primary | ICD-10-CM

## 2025-01-16 NOTE — TELEPHONE ENCOUNTER
FUTURE VISIT INFORMATION      FUTURE VISIT INFORMATION:  Date: 4/15/25  Time: 2:00pm  Location: [facility] [place]  REFERRAL INFORMATION:  Reason for visit/diagnosis  gender care    RECORDS REQUESTED FROM:       No recs to collect

## 2025-04-15 ENCOUNTER — PRE VISIT (OUTPATIENT)
Dept: OTOLARYNGOLOGY | Facility: CLINIC | Age: 30
End: 2025-04-15

## 2025-06-09 NOTE — TELEPHONE ENCOUNTER
FUTURE VISIT INFORMATION:  Appointment Date: 9/5/25  Reason for Visit/Diagnosis: New per signif other - ref, Gender dysphoria [F64.9], ref'd by Magui Rea MD, requested Felicia Singletary, ref-recs in epic, confirmed [facility]      REFERRAL INFORMATION:  Referring Provider:  Magui Rea MD   Referring Clinic:  [facility]     RECORDS NEEDED STATUS DETAILS   OFFICE NOTES from Referring Provider [facility]  9/6/24: Magui Rea MD    PROCEDURE REPORTS  *EGD, Laryngoscopy, PFT Care Everywhere [facility] [place]  11/1/24 Upper GI endoscopy   2/23/24 PFT   IMAGING (Reports only)   *Swallow study/esophagram, CT Layrnx, CT Head/Neck, etc Care Everywhere [facility] [place]  1/24/24 CT chest

## 2025-07-19 ENCOUNTER — HEALTH MAINTENANCE LETTER (OUTPATIENT)
Age: 30
End: 2025-07-19

## 2025-09-05 ENCOUNTER — PRE VISIT (OUTPATIENT)
Dept: OTOLARYNGOLOGY | Facility: CLINIC | Age: 30
End: 2025-09-05

## (undated) DEVICE — SU MONOCRYL 2-0 SH 27" UND Y417H

## (undated) DEVICE — PAD PERI W/WINGS 11"

## (undated) DEVICE — SUCTION TIP YANKAUER W/O VENT K86

## (undated) DEVICE — SOL WATER IRRIG 500ML BOTTLE 2F7113

## (undated) DEVICE — DRSG KERLIX FLUFFS X5

## (undated) DEVICE — ESU GROUND PAD ADULT W/CORD E7507

## (undated) DEVICE — DRSG KERLIX 4 1/2"X4YDS ROLL 6730

## (undated) DEVICE — TUBING SUCTION MEDI-VAC 1/4"X20' N620A

## (undated) DEVICE — DRAPE IOBAN INCISE 23X17" 6650EZ

## (undated) DEVICE — LINEN TOWEL PACK X5 5464

## (undated) DEVICE — SU VICRYL 3-0 SH 27" UND J416H

## (undated) DEVICE — DRSG XEROFORM 5X9" 8884431605

## (undated) DEVICE — PAD CHUX UNDERPAD 30X36" P3036C

## (undated) DEVICE — PREP SKIN SCRUB TRAY 4461A

## (undated) DEVICE — ESU ELEC NDL 1" COATED/INSULATED E1465

## (undated) DEVICE — PACK MINOR CUSTOM ASC

## (undated) DEVICE — SU MONOCRYL 4-0 PS-2 27" UND Y426H

## (undated) DEVICE — ADH SKIN CLOSURE PREMIERPRO EXOFIN 1.0ML 3470

## (undated) DEVICE — DRAPE GYN/UROLOGY FLUID POUCH TUR 29455

## (undated) DEVICE — DRAPE UNDER BUTTOCK 8483

## (undated) DEVICE — PREP POVIDONE-IODINE 7.5% SCRUB 4OZ BOTTLE MDS093945

## (undated) DEVICE — PANTIES MESH LG/XLG 2PK 706M2

## (undated) DEVICE — PREP POVIDONE IODINE SOLUTION 10% 4OZ BOTTLE 29906-004

## (undated) DEVICE — DRSG ADAPTIC 3X8" 6113

## (undated) DEVICE — BLADE KNIFE SURG 15 371115

## (undated) DEVICE — DRAPE U SPLIT 74X120" 29440

## (undated) DEVICE — SOL NACL 0.9% IRRIG 500ML BOTTLE 2F7123

## (undated) DEVICE — GLOVE BIOGEL PI MICRO SZ 7.5 48575

## (undated) DEVICE — STPL SKIN 35W ROTATING HEAD PRW35

## (undated) DEVICE — SU VICRYL 4-0 PS-2 18" UND J496G

## (undated) RX ORDER — DEXAMETHASONE SODIUM PHOSPHATE 4 MG/ML
INJECTION, SOLUTION INTRA-ARTICULAR; INTRALESIONAL; INTRAMUSCULAR; INTRAVENOUS; SOFT TISSUE
Status: DISPENSED
Start: 2023-06-09

## (undated) RX ORDER — PROPOFOL 10 MG/ML
INJECTION, EMULSION INTRAVENOUS
Status: DISPENSED
Start: 2023-06-09

## (undated) RX ORDER — GLYCOPYRROLATE 0.2 MG/ML
INJECTION INTRAMUSCULAR; INTRAVENOUS
Status: DISPENSED
Start: 2023-06-09

## (undated) RX ORDER — FENTANYL CITRATE 50 UG/ML
INJECTION, SOLUTION INTRAMUSCULAR; INTRAVENOUS
Status: DISPENSED
Start: 2023-06-09

## (undated) RX ORDER — CEFAZOLIN SODIUM 2 G/50ML
SOLUTION INTRAVENOUS
Status: DISPENSED
Start: 2023-06-09

## (undated) RX ORDER — GABAPENTIN 300 MG/1
CAPSULE ORAL
Status: DISPENSED
Start: 2023-06-09

## (undated) RX ORDER — ACETAMINOPHEN 325 MG/1
TABLET ORAL
Status: DISPENSED
Start: 2023-06-09

## (undated) RX ORDER — HYDROMORPHONE HYDROCHLORIDE 1 MG/ML
INJECTION, SOLUTION INTRAMUSCULAR; INTRAVENOUS; SUBCUTANEOUS
Status: DISPENSED
Start: 2023-06-09

## (undated) RX ORDER — ONDANSETRON 2 MG/ML
INJECTION INTRAMUSCULAR; INTRAVENOUS
Status: DISPENSED
Start: 2023-06-09